# Patient Record
Sex: MALE | Race: WHITE | HISPANIC OR LATINO | Employment: UNEMPLOYED | URBAN - METROPOLITAN AREA
[De-identification: names, ages, dates, MRNs, and addresses within clinical notes are randomized per-mention and may not be internally consistent; named-entity substitution may affect disease eponyms.]

---

## 2024-02-02 ENCOUNTER — OFFICE VISIT (OUTPATIENT)
Dept: INTERNAL MEDICINE CLINIC | Facility: CLINIC | Age: 54
End: 2024-02-02

## 2024-02-02 ENCOUNTER — APPOINTMENT (OUTPATIENT)
Dept: LAB | Facility: CLINIC | Age: 54
End: 2024-02-02

## 2024-02-02 VITALS
WEIGHT: 195.5 LBS | BODY MASS INDEX: 29.73 KG/M2 | HEART RATE: 64 BPM | DIASTOLIC BLOOD PRESSURE: 78 MMHG | OXYGEN SATURATION: 98 % | TEMPERATURE: 98.6 F | SYSTOLIC BLOOD PRESSURE: 123 MMHG

## 2024-02-02 DIAGNOSIS — R30.0 DYSURIA: Primary | ICD-10-CM

## 2024-02-02 DIAGNOSIS — R71.8 ELEVATED HEMATOCRIT: Primary | ICD-10-CM

## 2024-02-02 DIAGNOSIS — N40.0 BENIGN PROSTATIC HYPERPLASIA, UNSPECIFIED WHETHER LOWER URINARY TRACT SYMPTOMS PRESENT: ICD-10-CM

## 2024-02-02 DIAGNOSIS — Z11.59 NEED FOR HEPATITIS C SCREENING TEST: ICD-10-CM

## 2024-02-02 DIAGNOSIS — G89.29 CHRONIC LEFT-SIDED LOW BACK PAIN WITH LEFT-SIDED SCIATICA: ICD-10-CM

## 2024-02-02 DIAGNOSIS — M54.42 CHRONIC LEFT-SIDED LOW BACK PAIN WITH LEFT-SIDED SCIATICA: ICD-10-CM

## 2024-02-02 DIAGNOSIS — R30.0 DYSURIA: ICD-10-CM

## 2024-02-02 PROBLEM — Z87.442 HISTORY OF NEPHROLITHIASIS: Status: ACTIVE | Noted: 2024-02-02

## 2024-02-02 PROBLEM — N39.0 RECURRENT UTI (URINARY TRACT INFECTION): Status: ACTIVE | Noted: 2024-02-02

## 2024-02-02 LAB
ANION GAP SERPL CALCULATED.3IONS-SCNC: 6 MMOL/L
BILIRUB UR QL STRIP: NEGATIVE
BUN SERPL-MCNC: 24 MG/DL (ref 5–25)
CALCIUM SERPL-MCNC: 9.8 MG/DL (ref 8.4–10.2)
CHLORIDE SERPL-SCNC: 104 MMOL/L (ref 96–108)
CLARITY UR: CLEAR
CO2 SERPL-SCNC: 29 MMOL/L (ref 21–32)
COLOR UR: NORMAL
CREAT SERPL-MCNC: 1.06 MG/DL (ref 0.6–1.3)
ERYTHROCYTE [DISTWIDTH] IN BLOOD BY AUTOMATED COUNT: 12.6 % (ref 11.6–15.1)
GFR SERPL CREATININE-BSD FRML MDRD: 79 ML/MIN/1.73SQ M
GLUCOSE SERPL-MCNC: 91 MG/DL (ref 65–140)
GLUCOSE UR STRIP-MCNC: NEGATIVE MG/DL
HCT VFR BLD AUTO: 50.7 % (ref 36.5–49.3)
HGB BLD-MCNC: 17.1 G/DL (ref 12–17)
HGB UR QL STRIP.AUTO: NEGATIVE
KETONES UR STRIP-MCNC: NEGATIVE MG/DL
LEUKOCYTE ESTERASE UR QL STRIP: NEGATIVE
MCH RBC QN AUTO: 30.6 PG (ref 26.8–34.3)
MCHC RBC AUTO-ENTMCNC: 33.7 G/DL (ref 31.4–37.4)
MCV RBC AUTO: 91 FL (ref 82–98)
NITRITE UR QL STRIP: NEGATIVE
PH UR STRIP.AUTO: 6 [PH]
PLATELET # BLD AUTO: 243 THOUSANDS/UL (ref 149–390)
PMV BLD AUTO: 11.4 FL (ref 8.9–12.7)
POTASSIUM SERPL-SCNC: 4.5 MMOL/L (ref 3.5–5.3)
PROT UR STRIP-MCNC: NEGATIVE MG/DL
RBC # BLD AUTO: 5.58 MILLION/UL (ref 3.88–5.62)
SL AMB  POCT GLUCOSE, UA: NEGATIVE
SL AMB LEUKOCYTE ESTERASE,UA: NEGATIVE
SL AMB POCT BILIRUBIN,UA: NEGATIVE
SL AMB POCT BLOOD,UA: NORMAL
SL AMB POCT CLARITY,UA: CLEAR
SL AMB POCT COLOR,UA: YELLOW
SL AMB POCT KETONES,UA: NEGATIVE
SL AMB POCT NITRITE,UA: NEGATIVE
SL AMB POCT PH,UA: 6
SL AMB POCT SPECIFIC GRAVITY,UA: 1.02
SL AMB POCT URINE PROTEIN: 1
SL AMB POCT UROBILINOGEN: 0.2
SODIUM SERPL-SCNC: 139 MMOL/L (ref 135–147)
SP GR UR STRIP.AUTO: 1.02 (ref 1–1.03)
UROBILINOGEN UR STRIP-ACNC: <2 MG/DL
WBC # BLD AUTO: 5.36 THOUSAND/UL (ref 4.31–10.16)

## 2024-02-02 PROCEDURE — 99213 OFFICE O/P EST LOW 20 MIN: CPT | Performed by: STUDENT IN AN ORGANIZED HEALTH CARE EDUCATION/TRAINING PROGRAM

## 2024-02-02 PROCEDURE — 81003 URINALYSIS AUTO W/O SCOPE: CPT

## 2024-02-02 PROCEDURE — 85027 COMPLETE CBC AUTOMATED: CPT

## 2024-02-02 PROCEDURE — 87389 HIV-1 AG W/HIV-1&-2 AB AG IA: CPT

## 2024-02-02 PROCEDURE — 80048 BASIC METABOLIC PNL TOTAL CA: CPT

## 2024-02-02 PROCEDURE — 87521 HEPATITIS C PROBE&RVRS TRNSC: CPT

## 2024-02-02 PROCEDURE — 36415 COLL VENOUS BLD VENIPUNCTURE: CPT

## 2024-02-02 PROCEDURE — 81002 URINALYSIS NONAUTO W/O SCOPE: CPT | Performed by: STUDENT IN AN ORGANIZED HEALTH CARE EDUCATION/TRAINING PROGRAM

## 2024-02-02 PROCEDURE — 87491 CHLMYD TRACH DNA AMP PROBE: CPT

## 2024-02-02 PROCEDURE — 86780 TREPONEMA PALLIDUM: CPT

## 2024-02-02 PROCEDURE — 87591 N.GONORRHOEAE DNA AMP PROB: CPT

## 2024-02-02 PROCEDURE — 87522 HEPATITIS C REVRS TRNSCRPJ: CPT

## 2024-02-02 RX ORDER — TADALAFIL 5 MG/1
5 TABLET ORAL DAILY
Qty: 30 TABLET | Refills: 0 | Status: SHIPPED | OUTPATIENT
Start: 2024-02-02 | End: 2024-03-03

## 2024-02-02 RX ORDER — GABAPENTIN 100 MG/1
100 CAPSULE ORAL
Qty: 30 CAPSULE | Refills: 3 | Status: SHIPPED | OUTPATIENT
Start: 2024-02-02 | End: 2024-06-01

## 2024-02-02 NOTE — PROGRESS NOTES
Name: Hiral Cullen      : 1970      MRN: [id number]  Encounter Provider: Kris Shell MD  Encounter Date: 2024   Encounter department: [facility]    Assessment & Plan       1. Dysuria  - Pt is a 52 Yo M with PMH of sciatica, Left nephrolithiasis with hydronephrosis s/p stent, recurrent UTIs, presenting with 1 week history of dysuria. POCT urine test negative except for trace blood. No nitrate or leukocytes. Findings include suprapubic tenderness and L CTA tenderness. Differential includes nephrolithiasis vs UTI with possible pyelonephritis, though this is less likely given the absence of constitutional symptoms. Plan for CBC, UA with culture, STI screening, renal/bladder ultrasound with PVR, and cialis for possible BPH.   - If symptoms do not resolve, or worsens, instructed patient to go to the ED. May need urologic referral for possible cystoscopy given recurrent UTIs.   -     CBC and Platelet; Future  -     Basic metabolic panel; Future  -     POCT urine dip- negative  -     US kidney and bladder with pvr; Future; Expected date: 2024  -     HIV 1/2 AG/AB w Reflex [facility] for 2 yr old and above; Future  -     RPR-Syphilis Screening (Total Syphilis IGG/IGM); Future  -     Hepatitis C Ab W/Refl To HCV RNA, Qn, PCR; Future  -     Chlamydia/GC amplified DNA by PCR; Future  -     tadalafil (CIALIS) 5 MG tablet; Take 1 tablet (5 mg total) by mouth in the morning  -     UA w Reflex to Microscopic w Reflex to Culture    2. Need for hepatitis C screening test    3. Chronic left-sided low back pain with left-sided sciatica  - Patient reporting chronic sciatica, progressively worsening. Has tried exercises but without relief. He was prescribed gabapentin, but has not taken it because of issues with pharmacy. I re-prescribed it today and will recheck during next visit.   -     gabapentin (Neurontin) 100 mg capsule; Take 1 capsule (100 mg total) by mouth daily at  bedtime    4. Benign prostatic hyperplasia, unspecified whether lower urinary tract symptoms present  - Rectal exam to assess prostate unremarkable. Regular borders and prostate not enlarged. Will trial tadalafil at lose dose 5mg. No cardiac history to be concerned with.   -     tadalafil (CIALIS) 5 MG tablet; Take 1 tablet (5 mg total) by mouth in the morning        Depression Screening and Follow-up Plan: Patient was screened for depression during today's encounter. They screened negative with a PHQ-2 score of 2.        Subjective      Patient here originally for a comprehensive physical exam; however, this was changed for a focus problem exam given patient's complaints. The patient reports  1 week history of burning with urination without hematuria . 2 years ago, patient had large kidney stone with hydronephrosis in the left side requiring stent and suprapubic catheter. Does not remember if stent was removed but remembers having few complications. This was done in [facility]. Afterwards, patient had UTI at least 2-3 times, treated with antibiotics. Pt states his current symptoms are worse. Pt was also told he had an enlarged prostate. He has associated difficulty urinating and emptying his bladder. He has not been treated for this. Pain localized to suprapubic area as well as L flank pain. No flank pain from the previous UTI. He was told that he had other kidney stones in the right side but was not large enough for intervention. Urine POCT here showing trace blood.      Denies fever, chills, nausea, vomiting, no changes in appetite,   No CP or SOB.   No joint swelling, but reporting pain of bilateral knees and elbows 3 months  No body rash  Sexually active yes. Monomagous  No prior h/o of STI  No abn penile discharge.         Review of Systems   Constitutional:  Negative for activity change, appetite change, chills, fatigue, fever and unexpected weight change.   HENT:  Positive for sore throat. Negative for sinus  pain.    Eyes:  Negative for visual disturbance.   Respiratory:  Negative for cough, shortness of breath and wheezing.    Genitourinary:  Positive for dysuria and frequency. Negative for genital sores, hematuria, penile discharge, penile pain and penile swelling.   Musculoskeletal:  Positive for back pain. Negative for neck pain.   Skin:  Negative for rash.   Neurological:  Negative for dizziness, light-headedness, numbness and headaches.       Current Outpatient Medications on File Prior to Visit   Medication Sig    aspirin (ECOTRIN LOW STRENGTH) 81 mg EC tablet Take 81 mg by mouth daily    Diclofenac Sodium (VOLTAREN) 1 % Apply 2 g topically 4 (four) times a day For lower back pain.    [DISCONTINUED] gabapentin (Neurontin) 100 mg capsule Take 1 capsule (100 mg total) by mouth daily at bedtime       Objective     /78 (BP Location: Right arm, Patient Position: Sitting, Cuff Size: Standard)   Pulse 64   Temp 98.6 °F (37 °C) (Temporal)   Wt 88.7 kg (195 lb 8 oz)   SpO2 98%   BMI 29.73 kg/m²     Physical Exam  Constitutional:       General: He is not in acute distress.     Appearance: Normal appearance. He is normal weight. He is not ill-appearing or toxic-appearing.   HENT:      Head: Normocephalic and atraumatic.      Nose: Nose normal.      Mouth/Throat:      Mouth: Mucous membranes are moist.      Pharynx: Posterior oropharyngeal erythema present.   Eyes:      Pupils: Pupils are equal, round, and reactive to light.      Comments: Bilateral red eye, states it's chronic   Cardiovascular:      Rate and Rhythm: Normal rate and regular rhythm.      Pulses: Normal pulses.      Heart sounds: Normal heart sounds. No murmur heard.  Pulmonary:      Effort: Pulmonary effort is normal. No respiratory distress.      Breath sounds: Normal breath sounds.   Abdominal:      General: Abdomen is flat. Bowel sounds are normal.      Tenderness: There is abdominal tenderness (suprapubic and area overlaying L ureter). There is  left CVA tenderness. There is no right CVA tenderness.   Genitourinary:     Prostate: Normal.      Comments: GIOVANNY for prostate, prostate border regularly and not enlarged  Musculoskeletal:         General: Tenderness (on bilateral knee but no swelling) present. No swelling or deformity. Normal range of motion.   Lymphadenopathy:      Cervical: Cervical adenopathy (bilateral) present.   Skin:     General: Skin is warm.      Capillary Refill: Capillary refill takes less than 2 seconds.      Findings: No lesion or rash.   Neurological:      General: No focal deficit present.      Mental Status: He is alert and oriented to person, place, and time.   Psychiatric:         Mood and Affect: Mood normal.         Behavior: Behavior normal.       Kris Shell MD

## 2024-02-02 NOTE — PATIENT INSTRUCTIONS
Por favor, hágase valdez análisis de candice hoy   Por favor, vaya al centro de imágenes para hacerse yusuf ecografía de valdez riñón y también yusuf ecografía residual postmiccional [place] verifica cuánta orina queda después de orinar   Hoy no parecía que valdez próstata estuviera agrandada   Lo estoy comenzando con cialis 5mg para que lo tome yusuf vez al día [place] debería ayudar si tiene la próstata agrandada Si no está viendo ayuda con esto, podemos intentar algo más la próxima vez   Esperaremos a chaim si valdez muestra de orina crece alguna bacteria.Si es así, comenzaremos con antibióticos No estamos seguros si kaylee síntomas son de wisam en el riñón o de yusuf nueva infección Si kaylee síntomas empeoran, lauren fiebre, náuseas, vómitos o el dolor es peor, por favor vaya al departamento de emergencias Por favor regrese en 6 semanas para seguimiento y examen físico anual completo    Please get your blood work done today.   Please go to imaging center to get ultrasound of your kidney and do a post-void residual ultrasound as well. This checks how much urine is left after urinating.   It did not seem your prostate was enlarged today.   I am starting you on cialis 5mg for you take once a day. This should help if you have enlarged prostate. If you are not seeing help with this, we can try something else next time.   We will wait to see if your urine sample grows any bacteria. If it does, we will start you on antibiotics. We're not sure if your symptoms are from kidney stone or new infection.   If your symptoms get any worse, like fever, nausea, vomiting, or the pain is worse, please go to the emergency department.  Please return in 6 week  for follow up and full annual physical.

## 2024-02-03 LAB
HIV 1+2 AB+HIV1 P24 AG SERPL QL IA: NORMAL
HIV 2 AB SERPL QL IA: NORMAL
HIV1 AB SERPL QL IA: NORMAL
HIV1 P24 AG SERPL QL IA: NORMAL
TREPONEMA PALLIDUM IGG+IGM AB [PRESENCE] IN SERUM OR PLASMA BY IMMUNOASSAY: NORMAL

## 2024-02-04 LAB
C TRACH DNA SPEC QL NAA+PROBE: NEGATIVE
HCV RNA SERPL NAA+PROBE-ACNC: NOT DETECTED K[IU]/ML
N GONORRHOEA DNA SPEC QL NAA+PROBE: NEGATIVE

## 2024-02-06 ENCOUNTER — TELEPHONE (OUTPATIENT)
Dept: INTERNAL MEDICINE CLINIC | Facility: CLINIC | Age: 54
End: 2024-02-06

## 2024-02-06 NOTE — RESULT ENCOUNTER NOTE
All the results are within normal limits including HIV, syphilis, chlamydia, gonorrhea, Hepatitis C, but Hgb/blood count, appears to be elevated. I would like to get this rechecked. I tried calling today with a . Called both the patient and patient's spouse but was unable to reach them.     Can someone please call Mr. Leonard Cullen back and let patient know to get his blood work done again in 1 week? Thank you.

## 2024-02-21 PROBLEM — N39.0 RECURRENT UTI (URINARY TRACT INFECTION): Status: RESOLVED | Noted: 2024-02-02 | Resolved: 2024-02-21

## 2024-03-13 ENCOUNTER — HOSPITAL ENCOUNTER (OUTPATIENT)
Dept: RADIOLOGY | Facility: HOSPITAL | Age: 54
Discharge: HOME | End: 2024-03-13

## 2024-03-13 ENCOUNTER — APPOINTMENT (OUTPATIENT)
Dept: RADIOLOGY | Facility: HOSPITAL | Age: 54
End: 2024-03-13

## 2024-03-13 DIAGNOSIS — R30.0 DYSURIA: ICD-10-CM

## 2024-03-13 PROCEDURE — 76770 US EXAM ABDO BACK WALL COMP: CPT

## 2024-03-28 ENCOUNTER — APPOINTMENT (OUTPATIENT)
Dept: LAB | Facility: CLINIC | Age: 54
End: 2024-03-28

## 2024-03-28 ENCOUNTER — OFFICE VISIT (OUTPATIENT)
Dept: INTERNAL MEDICINE CLINIC | Facility: CLINIC | Age: 54
End: 2024-03-28

## 2024-03-28 VITALS
HEART RATE: 73 BPM | WEIGHT: 197 LBS | SYSTOLIC BLOOD PRESSURE: 122 MMHG | BODY MASS INDEX: 29.95 KG/M2 | DIASTOLIC BLOOD PRESSURE: 74 MMHG

## 2024-03-28 DIAGNOSIS — D75.1 ERYTHROCYTOSIS: ICD-10-CM

## 2024-03-28 DIAGNOSIS — Z00.00 ANNUAL PHYSICAL EXAM: Primary | ICD-10-CM

## 2024-03-28 DIAGNOSIS — R30.0 DYSURIA: ICD-10-CM

## 2024-03-28 DIAGNOSIS — R71.8 ELEVATED HEMATOCRIT: ICD-10-CM

## 2024-03-28 DIAGNOSIS — G89.29 CHRONIC LEFT-SIDED LOW BACK PAIN WITH LEFT-SIDED SCIATICA: ICD-10-CM

## 2024-03-28 DIAGNOSIS — N18.2 STAGE 2 CHRONIC KIDNEY DISEASE: ICD-10-CM

## 2024-03-28 DIAGNOSIS — M54.42 CHRONIC LEFT-SIDED LOW BACK PAIN WITH LEFT-SIDED SCIATICA: ICD-10-CM

## 2024-03-28 DIAGNOSIS — R61 DIAPHORESIS: ICD-10-CM

## 2024-03-28 DIAGNOSIS — N40.0 BENIGN PROSTATIC HYPERPLASIA, UNSPECIFIED WHETHER LOWER URINARY TRACT SYMPTOMS PRESENT: ICD-10-CM

## 2024-03-28 LAB
ALBUMIN SERPL BCP-MCNC: 4.7 G/DL (ref 3.5–5)
ALP SERPL-CCNC: 56 U/L (ref 34–104)
ALT SERPL W P-5'-P-CCNC: 27 U/L (ref 7–52)
AST SERPL W P-5'-P-CCNC: 20 U/L (ref 13–39)
BILIRUB DIRECT SERPL-MCNC: 0.09 MG/DL (ref 0–0.2)
BILIRUB SERPL-MCNC: 0.74 MG/DL (ref 0.2–1)
CREAT UR-MCNC: 117.9 MG/DL
ERYTHROCYTE [DISTWIDTH] IN BLOOD BY AUTOMATED COUNT: 11.9 % (ref 11.6–15.1)
HCT VFR BLD AUTO: 53.1 % (ref 36.5–49.3)
HGB BLD-MCNC: 18.2 G/DL (ref 12–17)
MAGNESIUM SERPL-MCNC: 2.2 MG/DL (ref 1.9–2.7)
MCH RBC QN AUTO: 31.5 PG (ref 26.8–34.3)
MCHC RBC AUTO-ENTMCNC: 34.3 G/DL (ref 31.4–37.4)
MCV RBC AUTO: 92 FL (ref 82–98)
MICROALBUMIN UR-MCNC: 25 MG/L
MICROALBUMIN/CREAT 24H UR: 21 MG/G CREATININE (ref 0–30)
PLATELET # BLD AUTO: 241 THOUSANDS/UL (ref 149–390)
PMV BLD AUTO: 11.9 FL (ref 8.9–12.7)
PROT SERPL-MCNC: 7.2 G/DL (ref 6.4–8.4)
RBC # BLD AUTO: 5.78 MILLION/UL (ref 3.88–5.62)
TSH SERPL DL<=0.05 MIU/L-ACNC: 1.88 UIU/ML (ref 0.45–4.5)
WBC # BLD AUTO: 5.55 THOUSAND/UL (ref 4.31–10.16)

## 2024-03-28 PROCEDURE — 82043 UR ALBUMIN QUANTITATIVE: CPT

## 2024-03-28 PROCEDURE — 99396 PREV VISIT EST AGE 40-64: CPT | Performed by: INTERNAL MEDICINE

## 2024-03-28 PROCEDURE — 36415 COLL VENOUS BLD VENIPUNCTURE: CPT

## 2024-03-28 PROCEDURE — 82570 ASSAY OF URINE CREATININE: CPT

## 2024-03-28 PROCEDURE — 84443 ASSAY THYROID STIM HORMONE: CPT

## 2024-03-28 PROCEDURE — 85027 COMPLETE CBC AUTOMATED: CPT

## 2024-03-28 PROCEDURE — 80076 HEPATIC FUNCTION PANEL: CPT

## 2024-03-28 PROCEDURE — 83735 ASSAY OF MAGNESIUM: CPT

## 2024-03-28 RX ORDER — GABAPENTIN 100 MG/1
300 CAPSULE ORAL
Qty: 90 CAPSULE | Refills: 3 | Status: SHIPPED | OUTPATIENT
Start: 2024-03-28 | End: 2024-07-26

## 2024-03-28 RX ORDER — MELATONIN
1000 DAILY
Qty: 30 TABLET | Refills: 3 | Status: SHIPPED | OUTPATIENT
Start: 2024-03-28 | End: 2024-07-26

## 2024-03-28 RX ORDER — TADALAFIL 5 MG/1
5 TABLET ORAL DAILY
Qty: 30 TABLET | Refills: 0 | Status: CANCELLED | OUTPATIENT
Start: 2024-03-28 | End: 2024-04-27

## 2024-03-28 RX ORDER — GABAPENTIN 100 MG/1
300 CAPSULE ORAL
Qty: 90 CAPSULE | Refills: 3 | Status: CANCELLED | OUTPATIENT
Start: 2024-03-28 | End: 2024-07-26

## 2024-03-28 NOTE — PATIENT INSTRUCTIONS
[place] si puede donar candice. Esta es yusuf de las formas en que puede tratar la candice espesa. Repitamos valdez análisis de candice para verificar y chaim si tiene yusuf mutación que le cause la candice espesa. Si esto resulta ser cierto, entonces me gustaría explorar formas de tratar esto en jonas momento.   Quiero hacerte yusuf radiografía de la espalda. A chaim si puedes hacer esto. Me gustaría tratarlo con suplementos de vitamina D en pankaj de que tenga un nivel bajo de calcio en la candice.   Aumenté valdez gabapentina de 100 mg a 300 mg para que lo tomes por la noche. Si valdez dolor de espalda y piernas no mejora, podemos explorar otras opciones.     See if you can give blood donation. This is one of the ways you can treat the thick blood. Lets repeat your blood work to double check and see if you have a mutation causing you to have the thick blood. If this comes back as true, then I'd like to explore ways to treat this at that point.   I want to get an xray of your back. See if you can get this done. I would like to treat you with vitamin D supplementation in case you have low blood calcium.   I increased your gabapentin from 100mg to 300mg for you to take at night. If your back and leg pain does not improve, then we can explore other options.     Wellness Visit for Adults   AMBULATORY CARE:   A wellness visit  is when you see your healthcare provider to get screened for health problems. Your healthcare provider will also give you advice on how to stay healthy. Write down your questions so you remember to ask them. Ask your healthcare provider how often you should have a wellness visit.  What happens at a wellness visit:  Your healthcare provider will ask about your health, and your family history of health problems. This includes high blood pressure, heart disease, and cancer. He or she will ask if you have symptoms that concern you, if you smoke, and about your mood. You may also be asked about your intake of medicines,  supplements, food, and alcohol. Any of the following may be done:  Your weight  will be checked. Your height may also be checked so your body mass index (BMI) can be calculated. Your BMI shows if you are at a healthy weight.    Your blood pressure  and heart rate will be checked. Your temperature may also be checked.    Blood and urine tests  may be done. Blood tests may be done to check your cholesterol levels. Abnormal cholesterol levels increase your risk for heart disease and stroke. You may also need a blood or urine test to check for diabetes if you are at increased risk. Urine tests may be done to look for signs of an infection or kidney disease.    A physical exam  includes checking your heartbeat and lungs with a stethoscope. Your healthcare provider may also check your skin to look for sun damage.    Screening tests  may be recommended. A screening test is done to check for diseases that may not cause symptoms. The screening tests you may need depend on your age, gender, family history, and lifestyle habits. For example, colorectal screening may be recommended if you are 50 years old or older.    Screening tests you need if you are a woman:   A Pap smear  is used to screen for cervical cancer. Pap smears are usually done every 3 to 5 years depending on your age. You may need them more often if you have had abnormal Pap smear test results in the past. Ask your healthcare provider how often you should have a Pap smear.    A mammogram  is an x-ray of your breasts to screen for breast cancer. Experts recommend mammograms every 2 years starting at age 50 years. You may need a mammogram at age 49 years or younger if you have an increased risk for breast cancer. Talk to your healthcare provider about when you should start having mammograms and how often you need them.    Vaccines you may need:   Get an influenza vaccine  every year. The influenza vaccine protects you from the flu. Several types of viruses cause  the flu. The viruses change over time, so new vaccines are made each year.    Get a tetanus-diphtheria (Td) booster vaccine  every 10 years. This vaccine protects you against tetanus and diphtheria. Tetanus is a severe infection that may cause painful muscle spasms and lockjaw. Diphtheria is a severe bacterial infection that causes a thick covering in the back of your mouth and throat.    Get a human papillomavirus (HPV) vaccine  if you are female and aged 19 to 26 or male 19 to 21 and never received it. This vaccine protects you from HPV infection. HPV is the most common infection spread by sexual contact. HPV may also cause vaginal, penile, and anal cancers.    Get a pneumococcal vaccine  if you are aged 65 years or older. The pneumococcal vaccine is an injection given to protect you from pneumococcal disease. Pneumococcal disease is an infection caused by pneumococcal bacteria. The infection may cause pneumonia, meningitis, or an ear infection.    Get a shingles vaccine  if you are 60 or older, even if you have had shingles before. The shingles vaccine is an injection to protect you from the varicella-zoster virus. This is the same virus that causes chickenpox. Shingles is a painful rash that develops in people who had chickenpox or have been exposed to the virus.    How to eat healthy:  My Plate is a model for planning healthy meals. It shows the types and amounts of foods that should go on your plate. Fruits and vegetables make up about half of your plate, and grains and protein make up the other half. A serving of dairy is included on the side of your plate. The amount of calories and serving sizes you need depends on your age, gender, weight, and height. Examples of healthy foods are listed below:  Eat a variety of vegetables  such as dark green, red, and orange vegetables. You can also include canned vegetables low in sodium (salt) and frozen vegetables without added butter or sauces.    Eat a variety of  fresh fruits , canned fruit in 100% juice, frozen fruit, and dried fruit.    Include whole grains.  At least half of the grains you eat should be whole grains. Examples include whole-wheat bread, wheat pasta, brown rice, and whole-grain cereals such as oatmeal.    Eat a variety of protein foods such as seafood (fish and shellfish), lean meat, and poultry without skin (turkey and chicken). Examples of lean meats include pork leg, shoulder, or tenderloin, and beef round, sirloin, tenderloin, and extra lean ground beef. Other protein foods include eggs and egg substitutes, beans, peas, soy products, nuts, and seeds.    Choose low-fat dairy products such as skim or 1% milk or low-fat yogurt, cheese, and cottage cheese.    Limit unhealthy fats  such as butter, hard margarine, and shortening.       Exercise:  Exercise at least 30 minutes per day on most days of the week. Some examples of exercise include walking, biking, dancing, and swimming. You can also fit in more physical activity by taking the stairs instead of the elevator or parking farther away from stores. Include muscle strengthening activities 2 days each week. Regular exercise provides many health benefits. It helps you manage your weight, and decreases your risk for type 2 diabetes, heart disease, stroke, and high blood pressure. Exercise can also help improve your mood. Ask your healthcare provider about the best exercise plan for you.       General health and safety guidelines:   Do not smoke.  Nicotine and other chemicals in cigarettes and cigars can cause lung damage. Ask your healthcare provider for information if you currently smoke and need help to quit. E-cigarettes or smokeless tobacco still contain nicotine. Talk to your healthcare provider before you use these products.    Limit alcohol.  A drink of alcohol is 12 ounces of beer, 5 ounces of wine, or 1½ ounces of liquor.    Lose weight, if needed.  Being overweight increases your risk of certain  health conditions. These include heart disease, high blood pressure, type 2 diabetes, and certain types of cancer.    Protect your skin.  Do not sunbathe or use tanning beds. Use sunscreen with a SPF 15 or higher. Apply sunscreen at least 15 minutes before you go outside. Reapply sunscreen every 2 hours. Wear protective clothing, hats, and sunglasses when you are outside.    Drive safely.  Always wear your seatbelt. Make sure everyone in your car wears a seatbelt. A seatbelt can save your life if you are in an accident. Do not use your cell phone when you are driving. This could distract you and cause an accident. Pull over if you need to make a call or send a text message.    Practice safe sex.  Use latex condoms if are sexually active and have more than one partner. Your healthcare provider may recommend screening tests for sexually transmitted infections (STIs).    Wear helmets, lifejackets, and protective gear.  Always wear a helmet when you ride a bike or motorcycle, go skiing, or play sports that could cause a head injury. Wear protective equipment when you play sports. Wear a lifejacket when you are on a boat or doing water sports.    © Copyright [employer] 2023 Information is for End User's use only and may not be sold, redistributed or otherwise used for commercial purposes.  The above information is an  only. It is not intended as medical advice for individual conditions or treatments. Talk to your doctor, nurse or pharmacist before following any medical regimen to see if it is safe and effective for you.

## 2024-03-28 NOTE — PROGRESS NOTES
ADULT ANNUAL PHYSICAL  [facility] [place]    NAME: Hiral Cullen  AGE: 53 y.o. SEX: male  : 1970     DATE: 3/28/2024     Assessment and Plan:     Problem List Items Addressed This Visit    None  Visit Diagnoses       Annual physical exam    -  Primary  Here for annual physical. Recently established care here. Moved from [facility] 5 months ago. Due to financial constraints, he has not had regular dental check or eye checks. Sleep quality not great. 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 Appears to have low health literacy. Will need regular follow up to ensure good understanding of patient's medical conditions. Unable to fully address annual check up tasks.   A1c 5.5. Appropriate  Prior nephrolithiasis with most likely new diagnosis of CKD2. Kidney damage not from HTN or DM. Will need to be monitored with BMP q6 months. Will also check microalbumin UA. 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 Less likely malignancy but will check if other differentials are ruled out. Will first recheck hemoglobin and hct. Will attempt to check HFE mutation, but given lack of insurance, may not be covered  During next visit, will recommend he sees . He will need ongoing financial support and additional health literacy education    BMI 29.0-29.9,adult      Encourage better diet and exercise      Dysuria        Previously here with urinary symptoms like CVA tenderness, dysuria, and suprapubic pain. At that time, POCT urine test clean. BMP kidney function wnl. STI neg. UA with microscope neg. CBC notable for Hgb 17 and Hct 50.7%.   US bladder kidney with PVR showing enlarged prostate and  bilateral renal parenchyma is diffusely echogenic consistent with medical renal disease. BMP indicating CKD2 likely from prior kidney injury. Will need ongoing monitoring  Ordered Cialis: Symptoms consistent with obstructive. are almost back to normal but with some dysuria.   Therefore, dysuria most likely from BPH. Will c/w cialis.      Benign prostatic hyperplasia, unspecified whether lower urinary tract symptoms present        Chronic left-sided low back pain with left-sided sciatica      sciatica to left leg. Progressively worsening due to work. Without significant red flag symptoms. Does not remember if he has had any injuries growing up. Will monitor patient's sensation. Will order XR. If concerning, will follow up with MRI. If XR mostly normal, then will proceed with PT. Also plan to increase gabapentin from low dose 100mg HS to 300mg HS. If he tolerates without significant fatigue in the morning, will consider dividing up the dose throughout the day. Will also start vitamin D in case patient actually has compression fracture.    Relevant Medications    cholecalciferol (VITAMIN D3) 1,000 units tablet    gabapentin (Neurontin) 100 mg capsule    Other Relevant Orders    XR spine lumbar minimum 4 views non injury    Ambulatory Referral to Financial Counseling Program    Diaphoresis      May be from high Hgb and Hct. Will check TSH. Unlikely malignancy.     Relevant Orders    TSH, 3rd generation with Free T4 reflex (Completed)    Erythrocytosis    Will check HFT, recheck Hgb and hct, as well as HFE mutation screen if he can get it covered. Most likely due to high altitude erythrocytosis which should start to resolve/improve [place]. If still elevated, may need to consider hemochromatosis, which will need therapeutic phlebotomies in the future.       Relevant Orders    Hemoglobin and hematocrit, blood    Hepatic function panel    Hemochromatosis mutation    Ambulatory Referral  "to Financial Counseling Program    Stage 2 chronic kidney disease        Relevant Orders    Albumin / creatinine urine ratio (Completed)    Magnesium                Immunizations and preventive care screenings were discussed with patient today. Appropriate education was printed on patient's after visit summary.        Counseling:  Alcohol/drug use: discussed moderation in alcohol intake, the recommendations for healthy alcohol use, and avoidance of illicit drug use.  Dental Health: discussed importance of regular tooth brushing, flossing, and dental visits.  Exercise: the importance of regular exercise/physical activity was discussed. Recommend exercise 3-5 times per week for at least 30 minutes.          Return in about 3 months (around 6/28/2024) for Recheck with Dr. Shell.     Chief Complaint:     Chief Complaint   Patient presents with    Physical Exam     Annual Physical       History of Present Illness:     Adult Annual Physical   Patient here for a comprehensive physical exam. Hiral Jackson is a 53 y.o. male with a past medical history of sciatica, nephrolithiasis s/p stent c/b infection with recurrent UTI, and chronic back pain. Pt previously in office for UTI like symptoms, but all work up negative, except for enlarged prostate and echogenic renal parenchyma on US bladder/kidney. At the visit, I prescribed Cialis which appears to have improved his symptoms. CBC did reveal, hwoever, Hgb 17, Hct 50.7%. Pt states that in [facility], he was told he had \"thick blood\" and for that reason, he was told to take ASA81. He has been on this medication for the past 3-4 years. Of importance, pt endorses living on high altitude in [facility] (3800ft above see level to be exact). He claims that many others in the area also have \"thick blood.\" Denies alcohol use or tobacco use. No HTN or obesity h/o. No h/o blood clots. No known family h/o erythrocytosis. No hot water intolerance. Does not bruise or bleed easily. No b symptoms " "except for fatigue and nighttime diaphoresis, which started 2020 after COVID. No weight or appetite changes. No early satiety. Family hx sig for brother with MI and death ~age 35.     Besides this, pt complains of bilateral red eyes which has been present for the past 20 years. He is distressed from it because it is commonly mistaken as \"drug use.\" No known allergies. No drainage. No eye pain or visual disturbance. No URI symptoms. No injury or chemical contact. Red eyes resolve with constant lubricant drops, but then slowly returns. Previous UA negative for proteinuria. Denies dry mouth. No skin rash. No hematuria. No joint complaint. Drinks about 4 bottles of water a day without polyuria or polydipsia. No h/o diabetes.     Additionally, pt c/o back pain with sciatica to left leg. Progressively worsening due to work. Denying left leg weakness. Burning sensation to the foot. Had scans in [facility] which, per patient, revealed compression fracture? Decreased sensation of L. During last visit, patient was started on gabapentin 100mg HS. Pain did not improve. Will increase to 300 mg HS.  XR of spine?    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   Exercise: 1-2 times a week on average and facebook exercises .      Depression Screening  PHQ-2/9 Depression Screening           General Health  Sleep: sleeps well and gets 4-6 hours of sleep on average.   Hearing: normal - bilateral.  Vision: no vision problems and most recent eye exam >1 year ago.   Dental: no dental visits for >1 year and brushes teeth twice daily.        Health  Symptoms include: dysuria    Advanced Care Planning  Do you have an advanced directive? no  Do you have a durable medical power of ? no  ACP document given to patient? no     Review of Systems:     Review of Systems   Constitutional:  Positive for fatigue. Negative for activity change, appetite " change, chills, fever and unexpected weight change.   HENT:  Negative for ear pain, mouth sores, rhinorrhea, sore throat and trouble swallowing.    Eyes:  Negative for pain and visual disturbance.   Respiratory:  Negative for cough, shortness of breath and wheezing.    Cardiovascular:  Negative for chest pain, palpitations and leg swelling.   Gastrointestinal:  Negative for abdominal pain, nausea and vomiting.   Genitourinary:  Positive for dysuria and flank pain. Negative for frequency and hematuria.   Musculoskeletal:  Positive for back pain and gait problem. Negative for joint swelling, neck pain and neck stiffness.   Skin:  Negative for rash.   Neurological:  Positive for weakness. Negative for light-headedness.   Hematological:  Does not bruise/bleed easily.      Past Medical History:     History reviewed. No pertinent past medical history.   Past Surgical History:     Past Surgical History:   Procedure Laterality Date    BLADDER STONE REMOVAL        Family History:     History reviewed. No pertinent family history.   Social History:     Social History     Socioeconomic History    Marital status: /Civil Union     Spouse name: None    Number of children: None    Years of education: None    Highest education level: None   Occupational History    None   Tobacco Use    Smoking status: Never    Smokeless tobacco: Never   Vaping Use    Vaping status: Never Used   Substance and Sexual Activity    Alcohol use: Not Currently    Drug use: Never    Sexual activity: None   Other Topics Concern    None   Social History Narrative    None     Social Determinants of Health     Financial Resource Strain: Low Risk  (2/2/2024)    Overall Financial Resource Strain (CARDIA)     Difficulty of Paying Living Expenses: Not hard at all   Food Insecurity: No Food Insecurity (2/2/2024)    Hunger Vital Sign     Worried About Running Out of Food in the Last Year: Never true     Ran Out of Food in the Last Year: Never true    Transportation Needs: No Transportation Needs (2/2/2024)    PRAPARE - Transportation     Lack of Transportation (Medical): No     Lack of Transportation (Non-Medical): No   Physical Activity: Not on file   Stress: Not on file   Social Connections: Not on file   Intimate Partner Violence: Not on file   Housing Stability: Low Risk  (3/28/2024)    Housing Stability Vital Sign     Unable to Pay for Housing in the Last Year: No     Number of Places Lived in the Last Year: 1     Unstable Housing in the Last Year: No      Current Medications:     Current Outpatient Medications   Medication Sig Dispense Refill    cholecalciferol (VITAMIN D3) 1,000 units tablet Take 1 tablet (1,000 Units total) by mouth daily 30 tablet 3    gabapentin (Neurontin) 100 mg capsule Take 3 capsules (300 mg total) by mouth daily at bedtime 90 capsule 3    aspirin (ECOTRIN LOW STRENGTH) 81 mg EC tablet Take 81 mg by mouth daily      Diclofenac Sodium (VOLTAREN) 1 % Apply 2 g topically 4 (four) times a day For lower back pain. 100 g 3    tadalafil (CIALIS) 5 MG tablet Take 1 tablet (5 mg total) by mouth in the morning 30 tablet 0     No current facility-administered medications for this visit.      Allergies:     No Known Allergies   Physical Exam:     /74 (BP Location: Right arm, Patient Position: Sitting, Cuff Size: Large)   Pulse 73   Wt 89.4 kg (197 lb)   BMI 29.95 kg/m²     Physical Exam  Constitutional:       General: He is not in acute distress.     Appearance: Normal appearance. He is not ill-appearing, toxic-appearing or diaphoretic.   HENT:      Head: Normocephalic and atraumatic.      Right Ear: Tympanic membrane normal.      Left Ear: Tympanic membrane normal.      Nose: Nose normal. No congestion.      Mouth/Throat:      Mouth: Mucous membranes are moist.      Pharynx: No oropharyngeal exudate.      Comments: Chronically enlarged bilateral tonsils, without drainage  Eyes:      General:         Right eye: No discharge.          Left eye: No discharge.      Extraocular Movements: Extraocular movements intact.      Pupils: Pupils are equal, round, and reactive to light.      Comments: Bilateral red eyes   Neck:      Comments: Large neck size  Cardiovascular:      Rate and Rhythm: Normal rate and regular rhythm.      Pulses: Normal pulses.      Heart sounds: No murmur heard.     Gallop present.      Comments: Splitting of S1, best heard in apex, otherwise no murmur  Pulmonary:      Effort: Pulmonary effort is normal. No respiratory distress.      Breath sounds: Normal breath sounds. No wheezing.   Abdominal:      General: Abdomen is flat.      Tenderness: There is no abdominal tenderness.   Musculoskeletal:         General: No swelling.      Cervical back: Normal range of motion. No tenderness.      Comments: L paraspinal tenderness with assoc mild tenderness on spine and R paraspinal area   Lymphadenopathy:      Cervical: No cervical adenopathy.   Skin:     General: Skin is warm.      Capillary Refill: Capillary refill takes less than 2 seconds.   Neurological:      Mental Status: He is alert and oriented to person, place, and time.      Cranial Nerves: No cranial nerve deficit.      Sensory: Sensory deficit present.      Motor: Weakness present.      Gait: Gait abnormal.      Comments: Decreased sensation on L, muscle strength 5/5 on RLE, 4/5 on LLE, (+) straight leg raise on L, ROM in LE decrease L>R limited by pain, reflexes unremarkable bilaterally in LE   Psychiatric:         Mood and Affect: Mood normal.         Behavior: Behavior normal.         Kris Shell MD  [facility]

## 2024-04-12 ENCOUNTER — HOSPITAL ENCOUNTER (OUTPATIENT)
Dept: RADIOLOGY | Facility: HOSPITAL | Age: 54
Discharge: HOME | End: 2024-04-12

## 2024-04-12 DIAGNOSIS — G89.29 CHRONIC LEFT-SIDED LOW BACK PAIN WITH LEFT-SIDED SCIATICA: ICD-10-CM

## 2024-04-12 DIAGNOSIS — M54.42 CHRONIC LEFT-SIDED LOW BACK PAIN WITH LEFT-SIDED SCIATICA: ICD-10-CM

## 2024-04-12 PROCEDURE — 72110 X-RAY EXAM L-2 SPINE 4/>VWS: CPT

## 2024-04-28 ENCOUNTER — APPOINTMENT (EMERGENCY)
Dept: RADIOLOGY | Facility: HOSPITAL | Age: 54
End: 2024-04-28

## 2024-04-28 ENCOUNTER — HOSPITAL ENCOUNTER (EMERGENCY)
Facility: HOSPITAL | Age: 54
Discharge: HOME | End: 2024-04-28
Attending: EMERGENCY MEDICINE

## 2024-04-28 VITALS
TEMPERATURE: 97.6 F | RESPIRATION RATE: 17 BRPM | SYSTOLIC BLOOD PRESSURE: 113 MMHG | HEART RATE: 67 BPM | OXYGEN SATURATION: 95 % | DIASTOLIC BLOOD PRESSURE: 72 MMHG

## 2024-04-28 DIAGNOSIS — N20.0 KIDNEY STONE: Primary | ICD-10-CM

## 2024-04-28 LAB
ALBUMIN SERPL BCP-MCNC: 4.3 G/DL (ref 3.5–5)
ALP SERPL-CCNC: 46 U/L (ref 34–104)
ALT SERPL W P-5'-P-CCNC: 25 U/L (ref 7–52)
ANION GAP SERPL CALCULATED.3IONS-SCNC: 6 MMOL/L (ref 4–13)
AST SERPL W P-5'-P-CCNC: 19 U/L (ref 13–39)
BACTERIA UR QL AUTO: NORMAL /HPF
BASOPHILS # BLD AUTO: 0.03 THOUSANDS/ÂΜL (ref 0–0.1)
BASOPHILS NFR BLD AUTO: 1 % (ref 0–1)
BILIRUB SERPL-MCNC: 0.83 MG/DL (ref 0.2–1)
BILIRUB UR QL STRIP: NEGATIVE
BUN SERPL-MCNC: 23 MG/DL (ref 5–25)
CALCIUM SERPL-MCNC: 9.1 MG/DL (ref 8.4–10.2)
CHLORIDE SERPL-SCNC: 107 MMOL/L (ref 96–108)
CLARITY UR: CLEAR
CO2 SERPL-SCNC: 26 MMOL/L (ref 21–32)
COLOR UR: COLORLESS
CREAT SERPL-MCNC: 0.94 MG/DL (ref 0.6–1.3)
EOSINOPHIL # BLD AUTO: 0.2 THOUSAND/ÂΜL (ref 0–0.61)
EOSINOPHIL NFR BLD AUTO: 4 % (ref 0–6)
ERYTHROCYTE [DISTWIDTH] IN BLOOD BY AUTOMATED COUNT: 11.8 % (ref 11.6–15.1)
GFR SERPL CREATININE-BSD FRML MDRD: 91 ML/MIN/1.73SQ M
GLUCOSE SERPL-MCNC: 92 MG/DL (ref 65–140)
GLUCOSE UR STRIP-MCNC: NEGATIVE MG/DL
HCT VFR BLD AUTO: 47.7 % (ref 36.5–49.3)
HGB BLD-MCNC: 16.5 G/DL (ref 12–17)
HGB UR QL STRIP.AUTO: ABNORMAL
IMM GRANULOCYTES # BLD AUTO: 0.01 THOUSAND/UL (ref 0–0.2)
IMM GRANULOCYTES NFR BLD AUTO: 0 % (ref 0–2)
KETONES UR STRIP-MCNC: NEGATIVE MG/DL
LEUKOCYTE ESTERASE UR QL STRIP: NEGATIVE
LIPASE SERPL-CCNC: 30 U/L (ref 11–82)
LYMPHOCYTES # BLD AUTO: 1.41 THOUSANDS/ÂΜL (ref 0.6–4.47)
LYMPHOCYTES NFR BLD AUTO: 30 % (ref 14–44)
MCH RBC QN AUTO: 31.2 PG (ref 26.8–34.3)
MCHC RBC AUTO-ENTMCNC: 34.6 G/DL (ref 31.4–37.4)
MCV RBC AUTO: 90 FL (ref 82–98)
MONOCYTES # BLD AUTO: 0.3 THOUSAND/ÂΜL (ref 0.17–1.22)
MONOCYTES NFR BLD AUTO: 6 % (ref 4–12)
NEUTROPHILS # BLD AUTO: 2.73 THOUSANDS/ÂΜL (ref 1.85–7.62)
NEUTS SEG NFR BLD AUTO: 59 % (ref 43–75)
NITRITE UR QL STRIP: NEGATIVE
NON-SQ EPI CELLS URNS QL MICRO: NORMAL /HPF
NRBC BLD AUTO-RTO: 0 /100 WBCS
PH UR STRIP.AUTO: 6.5 [PH]
PLATELET # BLD AUTO: 212 THOUSANDS/UL (ref 149–390)
PMV BLD AUTO: 10.9 FL (ref 8.9–12.7)
POTASSIUM SERPL-SCNC: 4.3 MMOL/L (ref 3.5–5.3)
PROT SERPL-MCNC: 6.7 G/DL (ref 6.4–8.4)
PROT UR STRIP-MCNC: NEGATIVE MG/DL
RBC # BLD AUTO: 5.29 MILLION/UL (ref 3.88–5.62)
RBC #/AREA URNS AUTO: NORMAL /HPF
SODIUM SERPL-SCNC: 139 MMOL/L (ref 135–147)
SP GR UR STRIP.AUTO: 1.03 (ref 1–1.03)
UROBILINOGEN UR STRIP-ACNC: <2 MG/DL
WBC # BLD AUTO: 4.68 THOUSAND/UL (ref 4.31–10.16)
WBC #/AREA URNS AUTO: NORMAL /HPF

## 2024-04-28 PROCEDURE — 74177 CT ABD & PELVIS W/CONTRAST: CPT

## 2024-04-28 PROCEDURE — 85025 COMPLETE CBC W/AUTO DIFF WBC: CPT

## 2024-04-28 PROCEDURE — 83690 ASSAY OF LIPASE: CPT

## 2024-04-28 PROCEDURE — 80053 COMPREHEN METABOLIC PANEL: CPT

## 2024-04-28 PROCEDURE — 99285 EMERGENCY DEPT VISIT HI MDM: CPT | Performed by: EMERGENCY MEDICINE

## 2024-04-28 PROCEDURE — 36415 COLL VENOUS BLD VENIPUNCTURE: CPT

## 2024-04-28 PROCEDURE — 96376 TX/PRO/DX INJ SAME DRUG ADON: CPT

## 2024-04-28 PROCEDURE — 81001 URINALYSIS AUTO W/SCOPE: CPT

## 2024-04-28 PROCEDURE — 96375 TX/PRO/DX INJ NEW DRUG ADDON: CPT

## 2024-04-28 PROCEDURE — 96366 THER/PROPH/DIAG IV INF ADDON: CPT

## 2024-04-28 PROCEDURE — 96365 THER/PROPH/DIAG IV INF INIT: CPT

## 2024-04-28 PROCEDURE — 99284 EMERGENCY DEPT VISIT MOD MDM: CPT

## 2024-04-28 RX ORDER — TAMSULOSIN HYDROCHLORIDE 0.4 MG/1
0.4 CAPSULE ORAL
Qty: 10 CAPSULE | Refills: 0 | Status: SHIPPED | OUTPATIENT
Start: 2024-04-28

## 2024-04-28 RX ORDER — KETOROLAC TROMETHAMINE 30 MG/ML
15 INJECTION, SOLUTION INTRAMUSCULAR; INTRAVENOUS ONCE
Status: COMPLETED | OUTPATIENT
Start: 2024-04-28 | End: 2024-04-28

## 2024-04-28 RX ORDER — OXYCODONE HYDROCHLORIDE 5 MG/1
5 TABLET ORAL EVERY 6 HOURS PRN
Qty: 20 TABLET | Refills: 0 | Status: SHIPPED | OUTPATIENT
Start: 2024-04-28

## 2024-04-28 RX ORDER — HYDROMORPHONE HCL/PF 1 MG/ML
0.5 SYRINGE (ML) INJECTION ONCE
Status: COMPLETED | OUTPATIENT
Start: 2024-04-28 | End: 2024-04-28

## 2024-04-28 RX ORDER — SODIUM CHLORIDE, SODIUM GLUCONATE, SODIUM ACETATE, POTASSIUM CHLORIDE, MAGNESIUM CHLORIDE, SODIUM PHOSPHATE, DIBASIC, AND POTASSIUM PHOSPHATE .53; .5; .37; .037; .03; .012; .00082 G/100ML; G/100ML; G/100ML; G/100ML; G/100ML; G/100ML; G/100ML
1000 INJECTION, SOLUTION INTRAVENOUS ONCE
Status: COMPLETED | OUTPATIENT
Start: 2024-04-28 | End: 2024-04-28

## 2024-04-28 RX ADMIN — IOHEXOL 100 ML: 350 INJECTION, SOLUTION INTRAVENOUS at 12:04

## 2024-04-28 RX ADMIN — HYDROMORPHONE HYDROCHLORIDE 0.5 MG: 1 INJECTION, SOLUTION INTRAMUSCULAR; INTRAVENOUS; SUBCUTANEOUS at 13:50

## 2024-04-28 RX ADMIN — SODIUM CHLORIDE, SODIUM GLUCONATE, SODIUM ACETATE, POTASSIUM CHLORIDE, MAGNESIUM CHLORIDE, SODIUM PHOSPHATE, DIBASIC, AND POTASSIUM PHOSPHATE 1000 ML: .53; .5; .37; .037; .03; .012; .00082 INJECTION, SOLUTION INTRAVENOUS at 11:23

## 2024-04-28 RX ADMIN — KETOROLAC TROMETHAMINE 15 MG: 30 INJECTION, SOLUTION INTRAMUSCULAR; INTRAVENOUS at 10:33

## 2024-04-28 RX ADMIN — HYDROMORPHONE HYDROCHLORIDE 0.5 MG: 1 INJECTION, SOLUTION INTRAMUSCULAR; INTRAVENOUS; SUBCUTANEOUS at 10:29

## 2024-04-28 NOTE — DISCHARGE INSTRUCTIONS
Take Tylenol Motrin on the clock  Oxy codone for breakthrough pain, hydrate well  Lulú for 10 days, urology follow-up outpatient if you have worsening symptoms come back to the ED.

## 2024-04-28 NOTE — ED ATTENDING ATTESTATION
4/28/2024  I, Lucas Crook MD, saw and evaluated the patient. I have discussed the patient with the resident/non-physician practitioner and agree with the resident's/non-physician practitioner's findings, Plan of Care, and MDM as documented in the resident's/non-physician practitioner's note, except where noted. All available labs and Radiology studies were reviewed.  I was present for key portions of any procedure(s) performed by the resident/non-physician practitioner and I was immediately available to provide assistance.       At this point I agree with the current assessment done in the Emergency Department.  I have conducted an independent evaluation of this patient a history and physical is as follows:  Patient presents with complaints of dysuria pressure in the bladder area lower abdominal pain  No fever no chills some nausea no vomiting no diarrhea  Prior abdominal surgery including hernia repair  Exam the patient is uncomfortable appearing his vital signs are stable he is afebrile sclera anicteric mucous memories moist lungs clear heart regular abdomen soft positive bowel sounds tenderness in the suprapubic area no CVA tenderness noted    CT scan shows a 6 mm UVJ stone that is almost entirely in the bladder  Mild hydroureter    Pain is controlled    Urine negative for infection  Renal function normal white count normal    Follow-up urology ambulatory referral placed  Oxycodone for pain  Zofran for nausea  For return precautions  ED Course         Critical Care Time  Procedures

## 2024-04-28 NOTE — ED PROVIDER NOTES
History  Chief Complaint   Patient presents with    Penis Pain     Started yesterday with penis pain. No blood when urinating. No back pain no abd pain no n/v      HPI  MDM  Pt is a 53 Y O M, hx of kidney stones presents for left groin pain that started couple of days ago.  No associated nausea vomiting or urinary changes.    Initial presentation pt is uncomfortable secondary to pain    On exam   General: VSS, NAD, awake, alert. Talking normally.   Head: Normocephalic, atraumatic, nontender.  Eyes: EOM-No subconjunctival hemorrhages.  ENT: Nose atraumatic. MMM  No malocclusion. No stridor. Normal phonation. No drooling. Normal swallowing.   Neck: Trachea midline. No JVD.  CV: RRR.   Lungs: CTAB No tachypnea. No paradoxical motion.  Abd: Moderate size abdominal tenderness no guarding/rigidity.   MSK: Full ROM throughout. No lower extremity edema.   Skin: Dry, intact.   Neuro: AAOx3, GCS 15, CN II-XII grossly intact. Motor/sensory grossly intact.  Psychiatric/Behavioral: mood/affect normal; behavior normal; thought content normal; judgement normal   Exam: deferred        Ddx: Renal pathology, SBO,pyelo    Plan: Patient was evaluated with UA and abdominal labs including CBC BMP lipase and also CT abdomen pelvis.  Imaging remarkable for stone almost in the left UVJ junction.  Stone likely passed into bladder, patient had symptomatic relief after pain management.  Urinary analysis unremarkable for infection.  Patient discharged with outpatient urology follow-up and was get pain medicine at [facility].  Discussed return cautions.      Final Dispo:     Pt is hemodynamically stable and clear for discharge with outpatient f/u with their PCP. Return precautions given pt verbalized understanding      Prior to Admission Medications   Prescriptions Last Dose Informant Patient Reported? Taking?   Diclofenac Sodium (VOLTAREN) 1 %   No No   Sig: Apply 2 g topically 4 (four) times a day For lower back pain.   aspirin (ECOTRIN LOW  STRENGTH) 81 mg EC tablet   Yes No   Sig: Take 81 mg by mouth daily   cholecalciferol (VITAMIN D3) 1,000 units tablet   No No   Sig: Take 1 tablet (1,000 Units total) by mouth daily   gabapentin (Neurontin) 100 mg capsule   No No   Sig: Take 3 capsules (300 mg total) by mouth daily at bedtime   tadalafil (CIALIS) 5 MG tablet   No No   Sig: Take 1 tablet (5 mg total) by mouth in the morning      Facility-Administered Medications: None       No past medical history on file.    Past Surgical History:   Procedure Laterality Date    BLADDER STONE REMOVAL         No family history on file.  I have reviewed and agree with the history as documented.    E-Cigarette/Vaping    E-Cigarette Use Never User      E-Cigarette/Vaping Substances    Nicotine No     THC No     CBD No     Flavoring No     Other No     Unknown No      Social History     Tobacco Use    Smoking status: Never    Smokeless tobacco: Never   Vaping Use    Vaping status: Never Used   Substance Use Topics    Alcohol use: Not Currently    Drug use: Never        Review of Systems    Physical Exam  ED Triage Vitals   Temperature Pulse Respirations Blood Pressure SpO2   04/28/24 0916 04/28/24 0916 04/28/24 0916 04/28/24 0916 04/28/24 0916   97.6 °F (36.4 °C) 75 20 135/86 95 %      Temp src Heart Rate Source Patient Position - Orthostatic VS BP Location FiO2 (%)   -- 04/28/24 1121 -- 04/28/24 1121 --    Monitor  Left arm       Pain Score       04/28/24 0916       10 - Worst Possible Pain             Orthostatic Vital Signs  Vitals:    04/28/24 0916 04/28/24 1121 04/28/24 1330   BP: 135/86 114/66 113/72   Pulse: 75 70 67       Physical Exam    ED Medications  Medications   HYDROmorphone (DILAUDID) injection 0.5 mg (0.5 mg Intravenous Given 4/28/24 1029)   ketorolac (TORADOL) injection 15 mg (15 mg Intravenous Given 4/28/24 1033)   multi-electrolyte (ISOLYTE-S PH 7.4) bolus 1,000 mL (0 mL Intravenous Stopped 4/28/24 1257)   iohexol (OMNIPAQUE) 350 MG/ML injection  (MULTI-DOSE) 100 mL (100 mL Intravenous Given 4/28/24 1204)   HYDROmorphone (DILAUDID) injection 0.5 mg (0.5 mg Intravenous Given 4/28/24 1350)       Diagnostic Studies  Results Reviewed       Procedure Component Value Units Date/Time    Urine Microscopic [[id number]]  (Normal) Collected: 04/28/24 1259    Lab Status: Final result Specimen: Urine, Clean Catch Updated: 04/28/24 1312     RBC, UA 1-2 /hpf      WBC, UA None Seen /hpf      Epithelial Cells None Seen /hpf      Bacteria, UA None Seen /hpf     UA w Reflex to Microscopic w Reflex to Culture [[id number]]  (Abnormal) Collected: 04/28/24 1259    Lab Status: Final result Specimen: Urine, Clean Catch Updated: 04/28/24 1312     Color, UA Colorless     Clarity, UA Clear     Specific Gravity, UA 1.029     pH, UA 6.5     Leukocytes, UA Negative     Nitrite, UA Negative     Protein, UA Negative mg/dl      Glucose, UA Negative mg/dl      Ketones, UA Negative mg/dl      Urobilinogen, UA <2.0 mg/dl      Bilirubin, UA Negative     Occult Blood, UA Small    Comprehensive metabolic panel [[id number]] Collected: 04/28/24 1037    Lab Status: Final result Specimen: Blood from Arm, Left Updated: 04/28/24 1113     Sodium 139 mmol/L      Potassium 4.3 mmol/L      Chloride 107 mmol/L      CO2 26 mmol/L      ANION GAP 6 mmol/L      BUN 23 mg/dL      Creatinine 0.94 mg/dL      Glucose 92 mg/dL      Calcium 9.1 mg/dL      AST 19 U/L      ALT 25 U/L      Alkaline Phosphatase 46 U/L      Total Protein 6.7 g/dL      Albumin 4.3 g/dL      Total Bilirubin 0.83 mg/dL      eGFR 91 ml/min/1.73sq m     Narrative:      National Kidney Disease Foundation guidelines for Chronic Kidney Disease (CKD):     Stage 1 with normal or high GFR (GFR > 90 mL/min/1.73 square meters)    Stage 2 Mild CKD (GFR = 60-89 mL/min/1.73 square meters)    Stage 3A Moderate CKD (GFR = 45-59 mL/min/1.73 square meters)    Stage 3B Moderate CKD (GFR = 30-44 mL/min/1.73 square meters)    Stage 4 Severe CKD (GFR = 15-29  mL/min/1.73 square meters)    Stage 5 End Stage CKD (GFR <15 mL/min/1.73 square meters)  Note: GFR calculation is accurate only with a steady state creatinine    Lipase [[id number]]  (Normal) Collected: 04/28/24 1037    Lab Status: Final result Specimen: Blood from Arm, Left Updated: 04/28/24 1113     Lipase 30 u/L     CBC and differential [[id number]] Collected: 04/28/24 1037    Lab Status: Final result Specimen: Blood from Arm, Left Updated: 04/28/24 1052     WBC 4.68 Thousand/uL      RBC 5.29 Million/uL      Hemoglobin 16.5 g/dL      Hematocrit 47.7 %      MCV 90 fL      MCH 31.2 pg      MCHC 34.6 g/dL      RDW 11.8 %      MPV 10.9 fL      Platelets 212 Thousands/uL      nRBC 0 /100 WBCs      Segmented % 59 %      Immature Grans % 0 %      Lymphocytes % 30 %      Monocytes % 6 %      Eosinophils Relative 4 %      Basophils Relative 1 %      Absolute Neutrophils 2.73 Thousands/µL      Absolute Immature Grans 0.01 Thousand/uL      Absolute Lymphocytes 1.41 Thousands/µL      Absolute Monocytes 0.30 Thousand/µL      Eosinophils Absolute 0.20 Thousand/µL      Basophils Absolute 0.03 Thousands/µL                    CT abdomen pelvis with contrast   Final Result by Rayo Penaloza MD (04/28 1251)      Left ureterovesicular junction 6 mm calculus with associated mild hydroureter and urothelial enhancement of the left distal ureter which may be secondary to recent stone passage versus ascending infection.      Additional nonobstructing right renal lower pole 5 mm calculus. Recommend urologic follow up.         The study was marked in EPIC for immediate notification.      I have personally reviewed this study including all images.  / R.J.F.         Resident: RONAK DIA I, the attending radiologist, have reviewed the images and agree with the final report above.      Workstation performed: [id number]               Procedures  Procedures      ED Course  ED Course as of 05/07/24 1200   Sun Apr 28, 2024   1237 Large  stone in bladder                                       Medical Decision Making  Amount and/or Complexity of Data Reviewed  Labs: ordered.  Radiology: ordered.    Risk  Prescription drug management.          Disposition  Final diagnoses:   Kidney stone     Time reflects when diagnosis was documented in both MDM as applicable and the Disposition within this note       Time User Action Codes Description Comment    4/28/2024  1:47 PM Lucas Crook Add [N20.0] Kidney stone           ED Disposition       ED Disposition   Discharge    Condition   Stable    Date/Time   Sun Apr 28, 2024  2:11 PM    Comment   Hiral Cullen discharge to home/self care.                   Follow-up Information       Follow up With Specialties Details Why Contact Info Additional Information    [facility] In 1 week  [address] [address]  [phone] [facility], [address], [place], [address]   [phone]    [facility] Emergency Department Emergency Medicine  If symptoms worsen [address] [address]  [phone] [facility], [address], [place], [address]   [phone]            Discharge Medication List as of 4/28/2024  2:12 PM        START taking these medications    Details   oxyCODONE (Roxicodone) 5 immediate release tablet Take 1 tablet (5 mg total) by mouth every 6 (six) hours as needed for moderate pain for up to 20 doses Max Daily Amount: 20 mg, Starting Sun 4/28/2024, Normal      tamsulosin (FLOMAX) 0.4 mg Take 1 capsule (0.4 mg total) by mouth daily with dinner, Starting Sun 4/28/2024, Normal           CONTINUE these medications which have NOT CHANGED    Details   aspirin (ECOTRIN LOW STRENGTH) 81 mg EC tablet Take 81 mg by mouth daily, Historical Med      cholecalciferol  (VITAMIN D3) 1,000 units tablet Take 1 tablet (1,000 Units total) by mouth daily, Starting Thu 3/28/2024, Until Fri 7/26/2024, Normal      Diclofenac Sodium (VOLTAREN) 1 % Apply 2 g topically 4 (four) times a day For lower back pain., Starting Wed 11/22/2023, Normal      gabapentin (Neurontin) 100 mg capsule Take 3 capsules (300 mg total) by mouth daily at bedtime, Starting Thu 3/28/2024, Until Fri 7/26/2024, Normal      tadalafil (CIALIS) 5 MG tablet Take 1 tablet (5 mg total) by mouth in the morning, Starting Fri 2/2/2024, Until Sun 3/3/2024, Normal               PDMP Review       None             ED Provider  Attending physically available and evaluated Teenadong JESUS RamirezVallevidya Cullen. I managed the patient along with the ED Attending.    Electronically Signed by           Susie Fletcher DO  05/07/24 Devan

## 2024-05-05 DIAGNOSIS — M54.9 BACK PAIN: Primary | ICD-10-CM

## 2024-05-05 RX ORDER — CELECOXIB 200 MG/1
200 CAPSULE ORAL 2 TIMES DAILY
Qty: 28 CAPSULE | Refills: 0 | Status: SHIPPED | OUTPATIENT
Start: 2024-05-05 | End: 2024-05-19

## 2024-05-30 ENCOUNTER — OFFICE VISIT (OUTPATIENT)
Dept: INTERNAL MEDICINE CLINIC | Facility: CLINIC | Age: 54
End: 2024-05-30

## 2024-05-30 VITALS
DIASTOLIC BLOOD PRESSURE: 57 MMHG | WEIGHT: 195 LBS | RESPIRATION RATE: 18 BRPM | SYSTOLIC BLOOD PRESSURE: 96 MMHG | HEIGHT: 67 IN | TEMPERATURE: 97.5 F | HEART RATE: 64 BPM | BODY MASS INDEX: 30.61 KG/M2

## 2024-05-30 DIAGNOSIS — G89.29 CHRONIC LEFT-SIDED LOW BACK PAIN WITH LEFT-SIDED SCIATICA: Primary | ICD-10-CM

## 2024-05-30 DIAGNOSIS — Z12.11 COLON CANCER SCREENING: ICD-10-CM

## 2024-05-30 DIAGNOSIS — N20.0 KIDNEY STONE: ICD-10-CM

## 2024-05-30 DIAGNOSIS — R30.0 DYSURIA: ICD-10-CM

## 2024-05-30 DIAGNOSIS — M54.42 CHRONIC LEFT-SIDED LOW BACK PAIN WITH LEFT-SIDED SCIATICA: Primary | ICD-10-CM

## 2024-05-30 DIAGNOSIS — N40.0 BENIGN PROSTATIC HYPERPLASIA, UNSPECIFIED WHETHER LOWER URINARY TRACT SYMPTOMS PRESENT: ICD-10-CM

## 2024-05-30 LAB
SL AMB  POCT GLUCOSE, UA: NEGATIVE
SL AMB LEUKOCYTE ESTERASE,UA: NEGATIVE
SL AMB POCT BILIRUBIN,UA: NEGATIVE
SL AMB POCT BLOOD,UA: NORMAL
SL AMB POCT CLARITY,UA: CLEAR
SL AMB POCT COLOR,UA: YELLOW
SL AMB POCT KETONES,UA: NEGATIVE
SL AMB POCT NITRITE,UA: NEGATIVE
SL AMB POCT PH,UA: 5.5
SL AMB POCT SPECIFIC GRAVITY,UA: 1.03
SL AMB POCT URINE PROTEIN: NEGATIVE
SL AMB POCT UROBILINOGEN: 0.2

## 2024-05-30 PROCEDURE — 99213 OFFICE O/P EST LOW 20 MIN: CPT | Performed by: STUDENT IN AN ORGANIZED HEALTH CARE EDUCATION/TRAINING PROGRAM

## 2024-05-30 PROCEDURE — 81002 URINALYSIS NONAUTO W/O SCOPE: CPT | Performed by: STUDENT IN AN ORGANIZED HEALTH CARE EDUCATION/TRAINING PROGRAM

## 2024-05-30 RX ORDER — TAMSULOSIN HYDROCHLORIDE 0.4 MG/1
0.4 CAPSULE ORAL
Qty: 10 CAPSULE | Refills: 0 | Status: SHIPPED | OUTPATIENT
Start: 2024-05-30

## 2024-05-30 RX ORDER — TADALAFIL 5 MG/1
5 TABLET ORAL DAILY
Qty: 30 TABLET | Refills: 0 | Status: SHIPPED | OUTPATIENT
Start: 2024-05-30 | End: 2024-06-29

## 2024-05-30 RX ORDER — GABAPENTIN 100 MG/1
CAPSULE ORAL
Qty: 120 CAPSULE | Refills: 3 | Status: SHIPPED | OUTPATIENT
Start: 2024-05-30 | End: 2024-09-27

## 2024-05-30 NOTE — PROGRESS NOTES
Ambulatory Visit  Name: Hiral Cullen      : 1970      MRN: [id number]  Encounter Provider: Kris Shell MD  Encounter Date: 2024   Encounter department: [facility]    Assessment & Plan   In summary, patient is a 53-year-old male with PMH of brittle cytosis, bilateral nonobstructive nephrolithiasis, BPH, chronic back pain presenting to the clinic for follow-up.  Exam notable for paraspinal tenderness, POCT dipstick positive for presence of blood, and improvement in his hemoglobin.  Plan to increase gabapentin with a referral to physical therapy, continue Cialis and Flomax for his BPH, referral to urology for his dysuria and nephrolithiasis with hematuria.     1. Chronic left-sided low back pain with left-sided sciatica  - Recent x-ray for his back pain showing moderate spondylosis and moderate facet disease of the lower lumbar spine.  Historically, he has also had left-sided sciatica but without muscle weakness or sensation changes.  Patient amenable to trying physical therapy first.  If this does not help, will refer him to orthopedics for possible injections/consideration for surgical intervention.  That being said, patient does see some relief from the gabapentin but still has ongoing pain throughout the day.  Will trial him on 100 mg in the daytime and continue to 300 mg at night.  If the patient does not feel to tired from the 100 mg in the morning, will increase this to 300 mg twice daily.  -     gabapentin (Neurontin) 100 mg capsule; Take 3 capsules (300 mg total) by mouth daily at bedtime AND 1 capsule (100 mg total) every morning.  -     Ambulatory Referral to Physical Therapy; Future  2. Dysuria  -     tadalafil (CIALIS) 5 MG tablet; Take 1 tablet (5 mg total) by mouth in the morning  -     Ambulatory Referral to Urology; Future  -     POCT urine dip  3. Benign prostatic hyperplasia, unspecified whether lower urinary tract symptoms present  -      tadalafil (CIALIS) 5 MG tablet; Take 1 tablet (5 mg total) by mouth in the morning  4. Kidney stone  - POCT urine dipstick revealing pH 5.5, 3+ blood, 200 rbcs.  This is concerning given that he had last passed a stone over a month ago.  Still endorses some dysuria but no gross hematuria.  Therefore, we will refer him to see a urologist for the ongoing hematuria for workup of possible bladder malignancy versus nephrolithiasis.  Also instructed patient to collect his urine with a toilet hat and then collect possible kidney stones for stone analysis.  -     tamsulosin (FLOMAX) 0.4 mg; Take 1 capsule (0.4 mg total) by mouth daily with dinner  -     Stone analysis; Future  -     POCT urine dip  5. Colon cancer screening  -     Cologuard    BMI Counseling: Body mass index is 30.54 kg/m². The BMI is above normal. Nutrition recommendations include decreasing portion sizes, encouraging healthy choices of fruits and vegetables, limiting drinks that contain sugar and reducing intake of cholesterol. No pharmacotherapy was ordered. Rationale for BMI follow-up plan is due to patient being overweight or obese.     Depression Screening and Follow-up Plan: Patient was screened for depression during today's encounter. They screened negative with a PHQ-2 score of 0.        History of Present Illness      [id number] used for this encounter. Hiral Jackson is a 53 y.o. male with a past medical history of erythrocytosis, bilateral nonobstructive nephrolithiasis without hydronephrosis, BPH, chronic back pain who presents to the clinic today because of ongoing back pain on the left side. Has not tried physical therapy but is considering it.  He was previously prescribed celecoxib as needed for the pain.  Patient states that he seldom takes some only when it is severe.  Otherwise, he has been taking his gabapentin 300 nightly as prescribed with some improvement but does not last throughout the day.  X-ray showed moderate  spondylosis and moderate facet disease of lower lumbar spine.  Otherwise, patient was in the ED 4/2024 for symptomatic nephrolithiasis with stone passage without intervention.  UA at that time demonstrating microscopic hematuria.  Since that time, patient reports resolution in his lower abdominal/suprapubic pain and denies hematuria and dysuria.  For the patient's history of erythrocytosis, of note, patient has a history of living in high elevation.  Therefore most likely due to reactive urosepsis.  In fact, last CBC showing improvement in hemoglobin now at 16.5 from 18.2.  Kidney function also appears to have improved with mild albuminuria of 25.  LFT and TSH were within normal limits.        Review of Systems   Constitutional:  Positive for fatigue. Negative for appetite change, chills, fever and unexpected weight change.   HENT:  Negative for nosebleeds, rhinorrhea and trouble swallowing.    Eyes:  Positive for redness and visual disturbance. Negative for photophobia.   Respiratory:  Negative for shortness of breath and wheezing.    Cardiovascular:  Negative for chest pain, palpitations and leg swelling.   Gastrointestinal:  Negative for abdominal pain, blood in stool, constipation, diarrhea, nausea and vomiting.   Genitourinary:  Positive for difficulty urinating and dysuria. Negative for hematuria.   Musculoskeletal:  Positive for back pain. Negative for neck pain.   Skin:  Negative for rash.   Neurological:  Negative for dizziness, light-headedness and headaches.     No past medical history on file.  Past Surgical History:   Procedure Laterality Date    BLADDER STONE REMOVAL       No family history on file.  Social History     Tobacco Use    Smoking status: Never    Smokeless tobacco: Never   Vaping Use    Vaping status: Never Used   Substance and Sexual Activity    Alcohol use: Not Currently    Drug use: Never    Sexual activity: Not on file     Current Outpatient Medications on File Prior to Visit  "  Medication Sig    aspirin (ECOTRIN LOW STRENGTH) 81 mg EC tablet Take 81 mg by mouth daily    celecoxib (CeleBREX) 200 mg capsule Take 1 capsule (200 mg total) by mouth 2 (two) times a day for 14 days    cholecalciferol (VITAMIN D3) 1,000 units tablet Take 1 tablet (1,000 Units total) by mouth daily    Diclofenac Sodium (VOLTAREN) 1 % Apply 2 g topically 4 (four) times a day For lower back pain.    oxyCODONE (Roxicodone) 5 immediate release tablet Take 1 tablet (5 mg total) by mouth every 6 (six) hours as needed for moderate pain for up to 20 doses Max Daily Amount: 20 mg    [DISCONTINUED] gabapentin (Neurontin) 100 mg capsule Take 3 capsules (300 mg total) by mouth daily at bedtime    [DISCONTINUED] tadalafil (CIALIS) 5 MG tablet Take 1 tablet (5 mg total) by mouth in the morning    [DISCONTINUED] tamsulosin (FLOMAX) 0.4 mg Take 1 capsule (0.4 mg total) by mouth daily with dinner     No Known Allergies    There is no immunization history on file for this patient.  Objective     BP 96/57 (BP Location: Left arm, Patient Position: Sitting, Cuff Size: Large)   Pulse 64   Temp 97.5 °F (36.4 °C) (Temporal)   Resp 18   Ht 5' 7\" (1.702 m)   Wt 88.5 kg (195 lb)   BMI 30.54 kg/m²     Physical Exam  Constitutional:       General: He is not in acute distress.     Appearance: He is normal weight. He is diaphoretic. He is not toxic-appearing.   HENT:      Head: Normocephalic and atraumatic.      Nose: Nose normal.      Mouth/Throat:      Mouth: Mucous membranes are moist.      Pharynx: Oropharynx is clear. No oropharyngeal exudate.      Comments: Enlarged tonsils and a deviated uvula  Eyes:      Pupils: Pupils are equal, round, and reactive to light.      Comments: Chronically red eyes   Cardiovascular:      Rate and Rhythm: Normal rate and regular rhythm.      Pulses: Normal pulses.      Heart sounds: Normal heart sounds.   Pulmonary:      Effort: Pulmonary effort is normal.      Breath sounds: Normal breath sounds. "   Abdominal:      General: Abdomen is flat. Bowel sounds are normal.      Tenderness: There is no abdominal tenderness. There is right CVA tenderness and left CVA tenderness. There is no guarding.      Comments: CVA tenderness most likely from paraspinal tenderness,   Musculoskeletal:         General: Normal range of motion.      Comments: No spinal tenderness   Skin:     General: Skin is warm.      Capillary Refill: Capillary refill takes less than 2 seconds.   Neurological:      General: No focal deficit present.      Mental Status: He is alert and oriented to person, place, and time.   Psychiatric:         Mood and Affect: Mood normal.         Behavior: Behavior normal.       Administrative Statements   I have spent a total time of 30 minutes on 05/30/24 In caring for this patient including Diagnostic results, Risks and benefits of tx options, Instructions for management, Patient and family education, Risk factor reductions, Impressions, and Reviewing / ordering tests, medicine, procedures  .

## 2024-05-30 NOTE — PATIENT INSTRUCTIONS
Please take the cialis and the flomax. It will help treat the enlarged prostate, causing you to have burning with urination  I will also make a referral for you to see a urologist.  They will determine if they need to do anything about your other kidney stone.  I will send Cologuard to your home. This will test if you might have colon cancer.  Please start physical therapy when you can.  Call the number on the paper that we gave you.  If this does not help your back pain, then we can refer you to orthopedics.  As I have instructed you, please collect one of your kidney stones and then either let the office know by calling or bring it to the lab.  At that point, we will run some test.  Please start taking gabapentin 100 mg in the morning and then 300 mg at night.  If you can tolerate the gabapentin in the morning, we can consider increasing it to 300.       Por favor tome cialis y flomax. Ayudará a tratar el agrandamiento de la próstata, que provoca ardor al orinar.   También le derivaré a un urólogo. Ellos determinarán si es necesario hacer algo con respecto a valdez otro cálculo renal.   Enviaré Cologuard a tu casa. [place] probará si usted podría tener cáncer de colon.   Por favor comience la fisioterapia cuando pueda. Llama al número que figura en el papel que te dimos. Si esto no ayuda con valdez dolor de espalda, podemos derivarlo a un ortopedista.   Kimberly le he indicado, recoja phillip de kaylee cálculos renales y luego infórmeselo a la oficina llamando o tráigalo al laboratorio. En jonas momento, realizaremos algunas pruebas.   Empiece a jonas gabapentina 100 mg por la mañana y luego 300 mg por la noche. Si puede tolerar la gabapentina por la mañana, podemos considerar aumentarla a 300.

## 2024-06-01 ENCOUNTER — HOSPITAL ENCOUNTER (INPATIENT)
Facility: HOSPITAL | Age: 54
LOS: 1 days | Discharge: HOME | DRG: 465 | End: 2024-06-03
Attending: EMERGENCY MEDICINE | Admitting: INTERNAL MEDICINE
Payer: COMMERCIAL

## 2024-06-01 DIAGNOSIS — N17.9 AKI (ACUTE KIDNEY INJURY) (HCC): ICD-10-CM

## 2024-06-01 DIAGNOSIS — N20.0 NEPHROLITHIASIS: Primary | ICD-10-CM

## 2024-06-01 LAB
ALBUMIN SERPL BCP-MCNC: 4.5 G/DL (ref 3.5–5)
ALP SERPL-CCNC: 53 U/L (ref 34–104)
ALT SERPL W P-5'-P-CCNC: 34 U/L (ref 7–52)
ANION GAP SERPL CALCULATED.3IONS-SCNC: 11 MMOL/L (ref 4–13)
AST SERPL W P-5'-P-CCNC: 25 U/L (ref 13–39)
BACTERIA UR QL AUTO: ABNORMAL /HPF
BASOPHILS # BLD AUTO: 0.07 THOUSANDS/ÂΜL (ref 0–0.1)
BASOPHILS NFR BLD AUTO: 1 % (ref 0–1)
BILIRUB SERPL-MCNC: 0.77 MG/DL (ref 0.2–1)
BILIRUB UR QL STRIP: NEGATIVE
BUN SERPL-MCNC: 25 MG/DL (ref 5–25)
CALCIUM SERPL-MCNC: 9.1 MG/DL (ref 8.4–10.2)
CHLORIDE SERPL-SCNC: 104 MMOL/L (ref 96–108)
CLARITY UR: CLEAR
CO2 SERPL-SCNC: 25 MMOL/L (ref 21–32)
COLOR UR: YELLOW
COLOR, POC: YELLOW
CREAT SERPL-MCNC: 1.53 MG/DL (ref 0.6–1.3)
EOSINOPHIL # BLD AUTO: 0.24 THOUSAND/ÂΜL (ref 0–0.61)
EOSINOPHIL NFR BLD AUTO: 2 % (ref 0–6)
ERYTHROCYTE [DISTWIDTH] IN BLOOD BY AUTOMATED COUNT: 12 % (ref 11.6–15.1)
GFR SERPL CREATININE-BSD FRML MDRD: 50 ML/MIN/1.73SQ M
GLUCOSE SERPL-MCNC: 157 MG/DL (ref 65–140)
GLUCOSE UR STRIP-MCNC: NEGATIVE MG/DL
HCT VFR BLD AUTO: 47.5 % (ref 36.5–49.3)
HGB BLD-MCNC: 16.8 G/DL (ref 12–17)
HGB UR QL STRIP.AUTO: ABNORMAL
IMM GRANULOCYTES # BLD AUTO: 0.05 THOUSAND/UL (ref 0–0.2)
IMM GRANULOCYTES NFR BLD AUTO: 0 % (ref 0–2)
KETONES UR STRIP-MCNC: NEGATIVE MG/DL
LEUKOCYTE ESTERASE UR QL STRIP: NEGATIVE
LYMPHOCYTES # BLD AUTO: 2.3 THOUSANDS/ÂΜL (ref 0.6–4.47)
LYMPHOCYTES NFR BLD AUTO: 19 % (ref 14–44)
MCH RBC QN AUTO: 31.8 PG (ref 26.8–34.3)
MCHC RBC AUTO-ENTMCNC: 35.4 G/DL (ref 31.4–37.4)
MCV RBC AUTO: 90 FL (ref 82–98)
MONOCYTES # BLD AUTO: 0.9 THOUSAND/ÂΜL (ref 0.17–1.22)
MONOCYTES NFR BLD AUTO: 7 % (ref 4–12)
NEUTROPHILS # BLD AUTO: 8.71 THOUSANDS/ÂΜL (ref 1.85–7.62)
NEUTS SEG NFR BLD AUTO: 71 % (ref 43–75)
NITRITE UR QL STRIP: NEGATIVE
NON-SQ EPI CELLS URNS QL MICRO: ABNORMAL /HPF
NRBC BLD AUTO-RTO: 0 /100 WBCS
PH UR STRIP.AUTO: 6.5 [PH] (ref 4.5–8)
PLATELET # BLD AUTO: 197 THOUSANDS/UL (ref 149–390)
PMV BLD AUTO: 10.8 FL (ref 8.9–12.7)
POTASSIUM SERPL-SCNC: 3.8 MMOL/L (ref 3.5–5.3)
PROT SERPL-MCNC: 6.8 G/DL (ref 6.4–8.4)
PROT UR STRIP-MCNC: NEGATIVE MG/DL
RBC # BLD AUTO: 5.29 MILLION/UL (ref 3.88–5.62)
RBC #/AREA URNS AUTO: ABNORMAL /HPF
SODIUM SERPL-SCNC: 140 MMOL/L (ref 135–147)
SP GR UR STRIP.AUTO: 1.02 (ref 1–1.03)
UROBILINOGEN UR QL STRIP.AUTO: 0.2 E.U./DL
WBC # BLD AUTO: 12.27 THOUSAND/UL (ref 4.31–10.16)
WBC #/AREA URNS AUTO: ABNORMAL /HPF

## 2024-06-01 PROCEDURE — 99284 EMERGENCY DEPT VISIT MOD MDM: CPT

## 2024-06-01 PROCEDURE — 85025 COMPLETE CBC W/AUTO DIFF WBC: CPT

## 2024-06-01 PROCEDURE — 99285 EMERGENCY DEPT VISIT HI MDM: CPT | Performed by: EMERGENCY MEDICINE

## 2024-06-01 PROCEDURE — 93005 ELECTROCARDIOGRAM TRACING: CPT

## 2024-06-01 PROCEDURE — 96375 TX/PRO/DX INJ NEW DRUG ADDON: CPT

## 2024-06-01 PROCEDURE — 36415 COLL VENOUS BLD VENIPUNCTURE: CPT

## 2024-06-01 PROCEDURE — 96365 THER/PROPH/DIAG IV INF INIT: CPT

## 2024-06-01 PROCEDURE — 81001 URINALYSIS AUTO W/SCOPE: CPT

## 2024-06-01 PROCEDURE — 80053 COMPREHEN METABOLIC PANEL: CPT

## 2024-06-01 RX ORDER — HYDROMORPHONE HCL/PF 1 MG/ML
0.5 SYRINGE (ML) INJECTION ONCE
Status: COMPLETED | OUTPATIENT
Start: 2024-06-01 | End: 2024-06-01

## 2024-06-01 RX ORDER — SODIUM CHLORIDE, SODIUM GLUCONATE, SODIUM ACETATE, POTASSIUM CHLORIDE, MAGNESIUM CHLORIDE, SODIUM PHOSPHATE, DIBASIC, AND POTASSIUM PHOSPHATE .53; .5; .37; .037; .03; .012; .00082 G/100ML; G/100ML; G/100ML; G/100ML; G/100ML; G/100ML; G/100ML
1000 INJECTION, SOLUTION INTRAVENOUS ONCE
Status: COMPLETED | OUTPATIENT
Start: 2024-06-01 | End: 2024-06-02

## 2024-06-01 RX ORDER — ONDANSETRON 2 MG/ML
4 INJECTION INTRAMUSCULAR; INTRAVENOUS ONCE
Status: COMPLETED | OUTPATIENT
Start: 2024-06-01 | End: 2024-06-01

## 2024-06-01 RX ORDER — KETOROLAC TROMETHAMINE 30 MG/ML
15 INJECTION, SOLUTION INTRAMUSCULAR; INTRAVENOUS ONCE
Status: COMPLETED | OUTPATIENT
Start: 2024-06-01 | End: 2024-06-01

## 2024-06-01 RX ADMIN — KETOROLAC TROMETHAMINE 15 MG: 30 INJECTION, SOLUTION INTRAMUSCULAR; INTRAVENOUS at 23:06

## 2024-06-01 RX ADMIN — ONDANSETRON 4 MG: 2 INJECTION INTRAMUSCULAR; INTRAVENOUS at 23:06

## 2024-06-01 RX ADMIN — HYDROMORPHONE HYDROCHLORIDE 0.5 MG: 1 INJECTION, SOLUTION INTRAMUSCULAR; INTRAVENOUS; SUBCUTANEOUS at 23:04

## 2024-06-01 RX ADMIN — SODIUM CHLORIDE, SODIUM GLUCONATE, SODIUM ACETATE, POTASSIUM CHLORIDE, MAGNESIUM CHLORIDE, SODIUM PHOSPHATE, DIBASIC, AND POTASSIUM PHOSPHATE 1000 ML: .53; .5; .37; .037; .03; .012; .00082 INJECTION, SOLUTION INTRAVENOUS at 23:15

## 2024-06-01 NOTE — LETTER
[facility]  [address] [facility] [address]  Dept: [phone]    Emily 3, 2024     Patient: Hiral Cullen   YOB: 1970   Date of Visit: 6/1/2024       To Whom it May Concern:    Hiral Cullen is under my professional care. He was seen in the hospital from 6/1/2024 to 06/03/24. He may return to work on 06/05 without limitations.    If you have any questions or concerns, please don't hesitate to call.         Sincerely,          Nguyễn Hayden MD

## 2024-06-02 ENCOUNTER — APPOINTMENT (OUTPATIENT)
Dept: RADIOLOGY | Facility: HOSPITAL | Age: 54
DRG: 465 | End: 2024-06-02
Payer: COMMERCIAL

## 2024-06-02 ENCOUNTER — ANESTHESIA EVENT (OUTPATIENT)
Dept: PERIOP | Facility: HOSPITAL | Age: 54
DRG: 465 | End: 2024-06-02
Payer: COMMERCIAL

## 2024-06-02 ENCOUNTER — ANESTHESIA (OUTPATIENT)
Dept: PERIOP | Facility: HOSPITAL | Age: 54
DRG: 465 | End: 2024-06-02
Payer: COMMERCIAL

## 2024-06-02 PROBLEM — N17.9 AKI (ACUTE KIDNEY INJURY) (HCC): Status: ACTIVE | Noted: 2024-06-02

## 2024-06-02 PROBLEM — D75.1 POLYCYTHEMIA: Status: ACTIVE | Noted: 2024-06-02

## 2024-06-02 PROBLEM — N20.0 NEPHROLITHIASIS: Status: ACTIVE | Noted: 2024-06-02

## 2024-06-02 LAB
ANION GAP SERPL CALCULATED.3IONS-SCNC: 6 MMOL/L (ref 4–13)
ATRIAL RATE: 61 BPM
BASOPHILS # BLD AUTO: 0.05 THOUSANDS/ÂΜL (ref 0–0.1)
BASOPHILS NFR BLD AUTO: 1 % (ref 0–1)
BUN SERPL-MCNC: 26 MG/DL (ref 5–25)
CALCIUM SERPL-MCNC: 8.3 MG/DL (ref 8.4–10.2)
CHLORIDE SERPL-SCNC: 104 MMOL/L (ref 96–108)
CO2 SERPL-SCNC: 26 MMOL/L (ref 21–32)
CREAT SERPL-MCNC: 1.72 MG/DL (ref 0.6–1.3)
EOSINOPHIL # BLD AUTO: 0.03 THOUSAND/ÂΜL (ref 0–0.61)
EOSINOPHIL NFR BLD AUTO: 0 % (ref 0–6)
ERYTHROCYTE [DISTWIDTH] IN BLOOD BY AUTOMATED COUNT: 12.1 % (ref 11.6–15.1)
GFR SERPL CREATININE-BSD FRML MDRD: 44 ML/MIN/1.73SQ M
GLUCOSE SERPL-MCNC: 123 MG/DL (ref 65–140)
HCT VFR BLD AUTO: 45.3 % (ref 36.5–49.3)
HGB BLD-MCNC: 15.7 G/DL (ref 12–17)
IMM GRANULOCYTES # BLD AUTO: 0.02 THOUSAND/UL (ref 0–0.2)
IMM GRANULOCYTES NFR BLD AUTO: 0 % (ref 0–2)
LYMPHOCYTES # BLD AUTO: 1.06 THOUSANDS/ÂΜL (ref 0.6–4.47)
LYMPHOCYTES NFR BLD AUTO: 12 % (ref 14–44)
MCH RBC QN AUTO: 31.4 PG (ref 26.8–34.3)
MCHC RBC AUTO-ENTMCNC: 34.7 G/DL (ref 31.4–37.4)
MCV RBC AUTO: 91 FL (ref 82–98)
MONOCYTES # BLD AUTO: 0.67 THOUSAND/ÂΜL (ref 0.17–1.22)
MONOCYTES NFR BLD AUTO: 8 % (ref 4–12)
NEUTROPHILS # BLD AUTO: 6.7 THOUSANDS/ÂΜL (ref 1.85–7.62)
NEUTS SEG NFR BLD AUTO: 79 % (ref 43–75)
NRBC BLD AUTO-RTO: 0 /100 WBCS
P AXIS: 54 DEGREES
PLATELET # BLD AUTO: 178 THOUSANDS/UL (ref 149–390)
PMV BLD AUTO: 11.1 FL (ref 8.9–12.7)
POTASSIUM SERPL-SCNC: 4.1 MMOL/L (ref 3.5–5.3)
PR INTERVAL: 188 MS
QRS AXIS: 91 DEGREES
QRSD INTERVAL: 90 MS
QT INTERVAL: 392 MS
QTC INTERVAL: 394 MS
RBC # BLD AUTO: 5 MILLION/UL (ref 3.88–5.62)
SODIUM SERPL-SCNC: 136 MMOL/L (ref 135–147)
T WAVE AXIS: 43 DEGREES
VENTRICULAR RATE: 61 BPM
WBC # BLD AUTO: 8.53 THOUSAND/UL (ref 4.31–10.16)

## 2024-06-02 PROCEDURE — 85025 COMPLETE CBC W/AUTO DIFF WBC: CPT

## 2024-06-02 PROCEDURE — 52356 CYSTO/URETERO W/LITHOTRIPSY: CPT | Performed by: UROLOGY

## 2024-06-02 PROCEDURE — 99255 IP/OBS CONSLTJ NEW/EST HI 80: CPT | Performed by: UROLOGY

## 2024-06-02 PROCEDURE — 0TF38ZZ FRAGMENTATION IN RIGHT KIDNEY PELVIS, VIA NATURAL OR ARTIFICIAL OPENING ENDOSCOPIC: ICD-10-PCS | Performed by: UROLOGY

## 2024-06-02 PROCEDURE — 87086 URINE CULTURE/COLONY COUNT: CPT | Performed by: UROLOGY

## 2024-06-02 PROCEDURE — C1894 INTRO/SHEATH, NON-LASER: HCPCS | Performed by: UROLOGY

## 2024-06-02 PROCEDURE — BT1D1ZZ FLUOROSCOPY OF RIGHT KIDNEY, URETER AND BLADDER USING LOW OSMOLAR CONTRAST: ICD-10-PCS | Performed by: UROLOGY

## 2024-06-02 PROCEDURE — 0T768DZ DILATION OF RIGHT URETER WITH INTRALUMINAL DEVICE, VIA NATURAL OR ARTIFICIAL OPENING ENDOSCOPIC: ICD-10-PCS | Performed by: UROLOGY

## 2024-06-02 PROCEDURE — 99024 POSTOP FOLLOW-UP VISIT: CPT | Performed by: UROLOGY

## 2024-06-02 PROCEDURE — 93010 ELECTROCARDIOGRAM REPORT: CPT | Performed by: INTERNAL MEDICINE

## 2024-06-02 PROCEDURE — 74176 CT ABD & PELVIS W/O CONTRAST: CPT

## 2024-06-02 PROCEDURE — 80048 BASIC METABOLIC PNL TOTAL CA: CPT

## 2024-06-02 PROCEDURE — C1758 CATHETER, URETERAL: HCPCS | Performed by: UROLOGY

## 2024-06-02 PROCEDURE — C1769 GUIDE WIRE: HCPCS | Performed by: UROLOGY

## 2024-06-02 PROCEDURE — 96366 THER/PROPH/DIAG IV INF ADDON: CPT

## 2024-06-02 PROCEDURE — 74420 UROGRAPHY RTRGR +-KUB: CPT

## 2024-06-02 PROCEDURE — C2617 STENT, NON-COR, TEM W/O DEL: HCPCS | Performed by: UROLOGY

## 2024-06-02 DEVICE — INLAY OPTIMA URETERAL STENT W/O GUIDEWIRE
Type: IMPLANTABLE DEVICE | Site: URETER | Status: FUNCTIONAL
Brand: BARD® INLAY OPTIMA® URETERAL STENT

## 2024-06-02 RX ORDER — OXYBUTYNIN CHLORIDE 5 MG/1
5 TABLET ORAL 2 TIMES DAILY
Status: DISCONTINUED | OUTPATIENT
Start: 2024-06-02 | End: 2024-06-03 | Stop reason: HOSPADM

## 2024-06-02 RX ORDER — ONDANSETRON 2 MG/ML
4 INJECTION INTRAMUSCULAR; INTRAVENOUS ONCE AS NEEDED
Status: DISCONTINUED | OUTPATIENT
Start: 2024-06-02 | End: 2024-06-02 | Stop reason: HOSPADM

## 2024-06-02 RX ORDER — FENTANYL CITRATE/PF 50 MCG/ML
50 SYRINGE (ML) INJECTION
Status: DISCONTINUED | OUTPATIENT
Start: 2024-06-02 | End: 2024-06-02 | Stop reason: HOSPADM

## 2024-06-02 RX ORDER — ACETAMINOPHEN 325 MG/1
975 TABLET ORAL EVERY 6 HOURS PRN
Status: DISCONTINUED | OUTPATIENT
Start: 2024-06-02 | End: 2024-06-03 | Stop reason: HOSPADM

## 2024-06-02 RX ORDER — FENTANYL CITRATE 50 UG/ML
INJECTION, SOLUTION INTRAMUSCULAR; INTRAVENOUS AS NEEDED
Status: DISCONTINUED | OUTPATIENT
Start: 2024-06-02 | End: 2024-06-02

## 2024-06-02 RX ORDER — ONDANSETRON 2 MG/ML
4 INJECTION INTRAMUSCULAR; INTRAVENOUS EVERY 6 HOURS PRN
Status: DISCONTINUED | OUTPATIENT
Start: 2024-06-02 | End: 2024-06-03 | Stop reason: HOSPADM

## 2024-06-02 RX ORDER — LIDOCAINE HYDROCHLORIDE 10 MG/ML
INJECTION, SOLUTION EPIDURAL; INFILTRATION; INTRACAUDAL; PERINEURAL AS NEEDED
Status: DISCONTINUED | OUTPATIENT
Start: 2024-06-02 | End: 2024-06-02

## 2024-06-02 RX ORDER — CEFAZOLIN SODIUM 2 G/50ML
2000 SOLUTION INTRAVENOUS ONCE
Status: DISCONTINUED | OUTPATIENT
Start: 2024-06-02 | End: 2024-06-02 | Stop reason: HOSPADM

## 2024-06-02 RX ORDER — MIDAZOLAM HYDROCHLORIDE 2 MG/2ML
INJECTION, SOLUTION INTRAMUSCULAR; INTRAVENOUS AS NEEDED
Status: DISCONTINUED | OUTPATIENT
Start: 2024-06-02 | End: 2024-06-02

## 2024-06-02 RX ORDER — HYDROMORPHONE HCL IN WATER/PF 6 MG/30 ML
0.2 PATIENT CONTROLLED ANALGESIA SYRINGE INTRAVENOUS EVERY 2 HOUR PRN
Status: DISCONTINUED | OUTPATIENT
Start: 2024-06-02 | End: 2024-06-02

## 2024-06-02 RX ORDER — POLYETHYLENE GLYCOL 3350 17 G/17G
17 POWDER, FOR SOLUTION ORAL DAILY PRN
Status: DISCONTINUED | OUTPATIENT
Start: 2024-06-02 | End: 2024-06-03 | Stop reason: HOSPADM

## 2024-06-02 RX ORDER — SODIUM CHLORIDE, SODIUM LACTATE, POTASSIUM CHLORIDE, CALCIUM CHLORIDE 600; 310; 30; 20 MG/100ML; MG/100ML; MG/100ML; MG/100ML
INJECTION, SOLUTION INTRAVENOUS CONTINUOUS PRN
Status: DISCONTINUED | OUTPATIENT
Start: 2024-06-02 | End: 2024-06-02

## 2024-06-02 RX ORDER — SODIUM CHLORIDE 9 MG/ML
100 INJECTION, SOLUTION INTRAVENOUS CONTINUOUS
Status: DISCONTINUED | OUTPATIENT
Start: 2024-06-02 | End: 2024-06-03 | Stop reason: HOSPADM

## 2024-06-02 RX ORDER — GABAPENTIN 300 MG/1
300 CAPSULE ORAL
Status: DISCONTINUED | OUTPATIENT
Start: 2024-06-02 | End: 2024-06-03 | Stop reason: HOSPADM

## 2024-06-02 RX ORDER — TAMSULOSIN HYDROCHLORIDE 0.4 MG/1
0.4 CAPSULE ORAL
Status: DISCONTINUED | OUTPATIENT
Start: 2024-06-02 | End: 2024-06-03 | Stop reason: HOSPADM

## 2024-06-02 RX ORDER — ONDANSETRON 2 MG/ML
INJECTION INTRAMUSCULAR; INTRAVENOUS AS NEEDED
Status: DISCONTINUED | OUTPATIENT
Start: 2024-06-02 | End: 2024-06-02

## 2024-06-02 RX ORDER — HEPARIN SODIUM 5000 [USP'U]/ML
5000 INJECTION, SOLUTION INTRAVENOUS; SUBCUTANEOUS EVERY 8 HOURS SCHEDULED
Status: DISCONTINUED | OUTPATIENT
Start: 2024-06-02 | End: 2024-06-03 | Stop reason: HOSPADM

## 2024-06-02 RX ORDER — PHENAZOPYRIDINE HYDROCHLORIDE 100 MG/1
100 TABLET, FILM COATED ORAL
Status: DISCONTINUED | OUTPATIENT
Start: 2024-06-02 | End: 2024-06-03 | Stop reason: HOSPADM

## 2024-06-02 RX ORDER — EPHEDRINE SULFATE 50 MG/ML
INJECTION INTRAVENOUS AS NEEDED
Status: DISCONTINUED | OUTPATIENT
Start: 2024-06-02 | End: 2024-06-02

## 2024-06-02 RX ORDER — HYDROMORPHONE HCL/PF 1 MG/ML
0.5 SYRINGE (ML) INJECTION EVERY 4 HOURS PRN
Status: DISCONTINUED | OUTPATIENT
Start: 2024-06-02 | End: 2024-06-03 | Stop reason: HOSPADM

## 2024-06-02 RX ORDER — OXYCODONE HYDROCHLORIDE 5 MG/1
5 TABLET ORAL EVERY 4 HOURS PRN
Status: DISCONTINUED | OUTPATIENT
Start: 2024-06-02 | End: 2024-06-03 | Stop reason: HOSPADM

## 2024-06-02 RX ORDER — SENNOSIDES 8.6 MG
1 TABLET ORAL DAILY
Status: DISCONTINUED | OUTPATIENT
Start: 2024-06-02 | End: 2024-06-03 | Stop reason: HOSPADM

## 2024-06-02 RX ORDER — CEFAZOLIN SODIUM 1 G/3ML
INJECTION, POWDER, FOR SOLUTION INTRAMUSCULAR; INTRAVENOUS AS NEEDED
Status: DISCONTINUED | OUTPATIENT
Start: 2024-06-02 | End: 2024-06-02

## 2024-06-02 RX ORDER — PROPOFOL 10 MG/ML
INJECTION, EMULSION INTRAVENOUS AS NEEDED
Status: DISCONTINUED | OUTPATIENT
Start: 2024-06-02 | End: 2024-06-02

## 2024-06-02 RX ORDER — SODIUM CHLORIDE, SODIUM GLUCONATE, SODIUM ACETATE, POTASSIUM CHLORIDE, MAGNESIUM CHLORIDE, SODIUM PHOSPHATE, DIBASIC, AND POTASSIUM PHOSPHATE .53; .5; .37; .037; .03; .012; .00082 G/100ML; G/100ML; G/100ML; G/100ML; G/100ML; G/100ML; G/100ML
125 INJECTION, SOLUTION INTRAVENOUS CONTINUOUS
Status: DISPENSED | OUTPATIENT
Start: 2024-06-02 | End: 2024-06-02

## 2024-06-02 RX ORDER — TADALAFIL 5 MG/1
5 TABLET ORAL DAILY
Status: DISCONTINUED | OUTPATIENT
Start: 2024-06-02 | End: 2024-06-03 | Stop reason: HOSPADM

## 2024-06-02 RX ORDER — GABAPENTIN 100 MG/1
100 CAPSULE ORAL EVERY MORNING
Status: DISCONTINUED | OUTPATIENT
Start: 2024-06-02 | End: 2024-06-03 | Stop reason: HOSPADM

## 2024-06-02 RX ADMIN — HEPARIN SODIUM 5000 UNITS: 5000 INJECTION INTRAVENOUS; SUBCUTANEOUS at 20:53

## 2024-06-02 RX ADMIN — OXYCODONE HYDROCHLORIDE 5 MG: 5 TABLET ORAL at 08:39

## 2024-06-02 RX ADMIN — OXYCODONE HYDROCHLORIDE 5 MG: 5 TABLET ORAL at 02:08

## 2024-06-02 RX ADMIN — GABAPENTIN 100 MG: 100 CAPSULE ORAL at 08:39

## 2024-06-02 RX ADMIN — PHENAZOPYRIDINE HYDROCHLORIDE 100 MG: 100 TABLET ORAL at 20:53

## 2024-06-02 RX ADMIN — SODIUM CHLORIDE, SODIUM LACTATE, POTASSIUM CHLORIDE, AND CALCIUM CHLORIDE: .6; .31; .03; .02 INJECTION, SOLUTION INTRAVENOUS at 11:22

## 2024-06-02 RX ADMIN — PROPOFOL 200 MG: 10 INJECTION, EMULSION INTRAVENOUS at 11:31

## 2024-06-02 RX ADMIN — FENTANYL CITRATE 50 MCG: 50 INJECTION INTRAMUSCULAR; INTRAVENOUS at 11:25

## 2024-06-02 RX ADMIN — FENTANYL CITRATE 50 MCG: 50 INJECTION INTRAMUSCULAR; INTRAVENOUS at 11:56

## 2024-06-02 RX ADMIN — TAMSULOSIN HYDROCHLORIDE 0.4 MG: 0.4 CAPSULE ORAL at 15:36

## 2024-06-02 RX ADMIN — Medication 1000 UNITS: at 08:39

## 2024-06-02 RX ADMIN — GABAPENTIN 300 MG: 300 CAPSULE ORAL at 20:52

## 2024-06-02 RX ADMIN — OXYCODONE HYDROCHLORIDE 5 MG: 5 TABLET ORAL at 20:52

## 2024-06-02 RX ADMIN — GABAPENTIN 300 MG: 300 CAPSULE ORAL at 02:06

## 2024-06-02 RX ADMIN — MIDAZOLAM 2 MG: 1 INJECTION INTRAMUSCULAR; INTRAVENOUS at 11:25

## 2024-06-02 RX ADMIN — HYDROMORPHONE HYDROCHLORIDE 0.2 MG: 0.2 INJECTION, SOLUTION INTRAMUSCULAR; INTRAVENOUS; SUBCUTANEOUS at 18:02

## 2024-06-02 RX ADMIN — EPHEDRINE SULFATE 10 MG: 50 INJECTION INTRAVENOUS at 11:34

## 2024-06-02 RX ADMIN — SENNOSIDES 8.6 MG: 8.6 TABLET, FILM COATED ORAL at 08:39

## 2024-06-02 RX ADMIN — HEPARIN SODIUM 5000 UNITS: 5000 INJECTION INTRAVENOUS; SUBCUTANEOUS at 06:10

## 2024-06-02 RX ADMIN — OXYBUTYNIN CHLORIDE 5 MG: 5 TABLET ORAL at 18:27

## 2024-06-02 RX ADMIN — SODIUM CHLORIDE 100 ML/HR: 0.9 INJECTION, SOLUTION INTRAVENOUS at 18:09

## 2024-06-02 RX ADMIN — ONDANSETRON 4 MG: 2 INJECTION INTRAMUSCULAR; INTRAVENOUS at 12:07

## 2024-06-02 RX ADMIN — FENTANYL CITRATE 50 MCG: 50 INJECTION INTRAMUSCULAR; INTRAVENOUS at 12:43

## 2024-06-02 RX ADMIN — LIDOCAINE HYDROCHLORIDE 50 MG: 10 INJECTION, SOLUTION EPIDURAL; INFILTRATION; INTRACAUDAL; PERINEURAL at 11:31

## 2024-06-02 RX ADMIN — HYDROMORPHONE HYDROCHLORIDE 0.2 MG: 0.2 INJECTION, SOLUTION INTRAMUSCULAR; INTRAVENOUS; SUBCUTANEOUS at 02:50

## 2024-06-02 RX ADMIN — HYDROMORPHONE HYDROCHLORIDE 0.2 MG: 0.2 INJECTION, SOLUTION INTRAMUSCULAR; INTRAVENOUS; SUBCUTANEOUS at 13:19

## 2024-06-02 RX ADMIN — ASPIRIN 81 MG: 81 TABLET, COATED ORAL at 08:39

## 2024-06-02 RX ADMIN — ACETAMINOPHEN 975 MG: 325 TABLET, FILM COATED ORAL at 18:27

## 2024-06-02 RX ADMIN — SODIUM CHLORIDE, SODIUM GLUCONATE, SODIUM ACETATE, POTASSIUM CHLORIDE, MAGNESIUM CHLORIDE, SODIUM PHOSPHATE, DIBASIC, AND POTASSIUM PHOSPHATE 125 ML/HR: .53; .5; .37; .037; .03; .012; .00082 INJECTION, SOLUTION INTRAVENOUS at 03:10

## 2024-06-02 RX ADMIN — CEFAZOLIN 2000 MG: 1 INJECTION, POWDER, FOR SOLUTION INTRAMUSCULAR; INTRAVENOUS at 11:33

## 2024-06-02 NOTE — UTILIZATION REVIEW
Initial Clinical Review    OBSERVATION WRITTEN 6/2/24 @ 0143 CONVERTED TO INPATIENT ADMISSION 6/2/24 @ 1347 DUE TO FURTHER DIAGNOSTIC WORKUP REQUIRED FOR NEPHROLITHIASIS, REQUIRING AT LEAST A 2 MIDNIGHT STAY.    Admission: Date/Time/Statement:   Admission Orders (From admission, onward)       Ordered        06/02/24 1347  INPATIENT ADMISSION  Once            06/02/24 0143  Place in Observation  Once                          Orders Placed This Encounter   Procedures    INPATIENT ADMISSION     Standing Status:   Standing     Number of Occurrences:   1     Order Specific Question:   Level of Care     Answer:   Med Surg [16]     Order Specific Question:   Estimated length of stay     Answer:   More than 2 Midnights     Order Specific Question:   Certification     Answer:   I certify that inpatient services are medically necessary for this patient for a duration of greater than two midnights. See H&P and MD Progress Notes for additional information about the patient's course of treatment.     ED Arrival Information       Expected   -    Arrival   6/1/2024 22:44    Acuity   Urgent              Means of arrival   Walk-In    Escorted by   Family Member    Service   [facility]    Admission type   Emergency              Arrival complaint   Abd pain             Chief Complaint   Patient presents with    Abdominal Pain     Pt has R sided abdominal pain and R sided flank pain. Pt has a stone in R kidney. +N/V       Initial Presentation: 53 y.o. male with PMHx: [facility], history of nephrolithiasis s/p stenting in the past with recurrent UTIs, polycythemia on aspirin and chronic lower back pain/sciatica,, who presented on 6/1 to [facility] ED initially admitted Observation status then converted to Inpatient due to Nephrolithiasis.  Presented due to worsening right lower back pain radiating to the right groin, associated with nausea. Of note, he says he has had urologic  instrumentation in the past, including cystoscopy and an unspecified bladder surgery/procedure.     In the ED, hemodynamically stable on arrival.  Was treated with Toradol, followed by Dilaudid with marked improvement in pain.  Additionally received Zofran and 1L of Isolyte.  Lab work notable for leukocytosis of 12 K with neutrophilia, UA showing 10-20 RBCs per high-powered field and chemistry showing creatinine of 1.53, elevated from baseline of approximately 1.0.  Imaging was not repeated prior to admission.   Plan: med surg, Urology consult, fu on CT renal stone study, keep NPO, start accuchecks, continue pain control prn, give IVF, hold ABX for now, continue home meds. PT OT eval, CM consult. SCDs.      6/2/2024 Inpatient Admission:  Per Urology: 4+ millimeter right proximal ureteral calculus with right-sided hydronephrosis: As the patient has intractable flank pain secondary to the stone without signs of fever or infection, recommend cystoscopy with right ureteroscopy, holmium laser lithotripsy, right retrograde pyelography and right ureteral stent insertion. Continue above tx plan including NPO and IVF, pain control, monitor VS and labs, continue home meds.     6/2 Per OP Note:  Procedure(s):  Right - CYSTOSCOPY URETEROSCOPY WITH LITHOTRIPSY HOLMIUM LASER. RETROGRADE PYELOGRAM AND INSERTION STENT URETERAL  Drains: Right 24-6 Hungarian double-J ureteral stent with string  Anesthesia Type: General  Operative Findings:  Right proximal ureteral calculus pushed back into the kidney.  The stone was then decimated with a 272 µm holmium laser fiber until all fragments were small enough to be passable.    ED Triage Vitals   Temperature Pulse Respirations Blood Pressure SpO2   06/01/24 2248 06/01/24 2248 06/01/24 2248 06/01/24 2248 06/01/24 2248   99 °F (37.2 °C) 60 18 142/98 100 %      Temp Source Heart Rate Source Patient Position - Orthostatic VS BP Location FiO2 (%)   06/01/24 2248 06/01/24 2248 06/02/24 0237 06/01/24  2248 --   Tympanic Monitor Lying Left arm       Pain Score       06/01/24 2248       10 - Worst Possible Pain          Wt Readings from Last 1 Encounters:   05/30/24 88.5 kg (195 lb)     Additional Vital Signs:   Date/Time Temp Pulse Resp BP MAP (mmHg) SpO2 Calculated FIO2 (%) - Nasal Cannula Nasal Cannula O2 Flow Rate (L/min) O2 Device Cardiac (WDL) Patient Position - Orthostatic VS   06/02/24 1300 -- 72 18 114/73 83 94 % -- -- -- WDL --   06/02/24 1245 -- 78 13 119/67 82 95 % -- -- None (Room air) WDL --   06/02/24 1230 -- 80 14 109/70 88 95 % -- -- -- -- --   06/02/24 1220 97.8 °F (36.6 °C) 92 18 118/67 88 96 % -- -- None (Room air) WDL --   06/02/24 0809 97.8 °F (36.6 °C) -- -- 109/56 74 -- -- -- -- -- --   06/02/24 0250 -- -- -- -- -- 98 % -- -- None (Room air) -- --   06/02/24 02:37:03 98.4 °F (36.9 °C) 66 -- 129/74 92 98 % -- -- -- -- Lying   06/02/24 0100 -- 68 -- 123/63 87 97 % -- -- -- -- --   06/02/24 0000 -- 70 -- 119/57 82 97 % -- -- -- -- --   06/01/24 2332 -- -- -- -- -- 98 % 28 2 L/min Nasal cannula -- --   06/01/24 2323 -- -- -- -- -- 88 % Abnormal  -- -- None (Room air) -- --   06/01/24 2248 99 °F (37.2 °C) 60 18 142/98 -- 100 % -- -- None (Room air) -- --       Pertinent Labs/Diagnostic Test Results:   6/1 EKG: NSR    CT renal stone study abdomen pelvis wo contrast   Final Result by Jean Joshua MD (06/02 0813)      Interval development of mild right-sided hydroureteronephrosis secondary to a 4 mm calculus in the proximal right ureter. Right-sided perinephric and periureteral stranding.               Workstation performed: [id number]         FL retrograde pyelogram    (Results Pending)         Results from last 7 days   Lab Units 06/02/24  0428 06/01/24  2307   WBC Thousand/uL 8.53 12.27*   HEMOGLOBIN g/dL 15.7 16.8   HEMATOCRIT % 45.3 47.5   PLATELETS Thousands/uL 178 197   TOTAL NEUT ABS Thousands/µL 6.70 8.71*         Results from last 7 days   Lab Units 06/02/24  0428 06/01/24 2307    SODIUM mmol/L 136 140   POTASSIUM mmol/L 4.1 3.8   CHLORIDE mmol/L 104 104   CO2 mmol/L 26 25   ANION GAP mmol/L 6 11   BUN mg/dL 26* 25   CREATININE mg/dL 1.72* 1.53*   EGFR ml/min/1.73sq m 44 50   CALCIUM mg/dL 8.3* 9.1     Results from last 7 days   Lab Units 06/01/24  2307   AST U/L 25   ALT U/L 34   ALK PHOS U/L 53   TOTAL PROTEIN g/dL 6.8   ALBUMIN g/dL 4.5   TOTAL BILIRUBIN mg/dL 0.77         Results from last 7 days   Lab Units 06/02/24  0428 06/01/24  2307   GLUCOSE RANDOM mg/dL 123 157*     Results from last 7 days   Lab Units 06/01/24  2312 06/01/24  2310 05/30/24  1232   CLARITY UA   --  Clear clear   COLOR UA  Yellow Yellow yellow   SPEC GRAV UA   --  1.025  --    PH UA   --  6.5  --    GLUCOSE UA mg/dl  --  Negative negative   KETONES UA mg/dl  --  Negative negative   BLOOD UA   --  Moderate* large   PROTEIN UA mg/dl  --  Negative negative   NITRITE UA   --  Negative negative   BILIRUBIN UA   --  Negative  --    BILIRUBIN UA POC   --   --  negative   UROBILINOGEN UA E.U./dl  --  0.2 0.2   LEUKOCYTES UA   --  Negative negative   WBC UA /hpf  --  None Seen  --    RBC UA /hpf  --  10-20*  --    BACTERIA UA /hpf  --  None Seen  --    EPITHELIAL CELLS WET PREP /hpf  --  None Seen  --        ED Treatment:   Medication Administration from 06/01/2024 2244 to 06/02/2024 0230         Date/Time Order Dose Route Action     06/01/2024 2315 EDT multi-electrolyte (ISOLYTE-S PH 7.4) bolus 1,000 mL 1,000 mL Intravenous New Bag     06/01/2024 2306 EDT ondansetron (ZOFRAN) injection 4 mg 4 mg Intravenous Given     06/01/2024 2304 EDT HYDROmorphone (DILAUDID) injection 0.5 mg 0.5 mg Intravenous Given     06/01/2024 2306 EDT ketorolac (TORADOL) injection 15 mg 15 mg Intravenous Given     06/02/2024 0208 EDT oxyCODONE (ROXICODONE) split tablet 2.5 mg -- Oral See Alternative     06/02/2024 0208 EDT oxyCODONE (ROXICODONE) IR tablet 5 mg 5 mg Oral Given     06/02/2024 0206 EDT gabapentin (NEURONTIN) capsule 300 mg 300 mg  Oral Given          History reviewed. No pertinent past medical history.  Present on Admission:   Nephrolithiasis   GLADYS (acute kidney injury) (HCC)   Polycythemia      Admitting Diagnosis: Nephrolithiasis [N20.0]  Abdominal pain [R10.9]  GLADYS (acute kidney injury) (HCC) [N17.9]  Age/Sex: 53 y.o. male  Admission Orders:  Scheduled Medications:  aspirin, 81 mg, Oral, Daily  cholecalciferol, 1,000 Units, Oral, Daily  gabapentin, 300 mg, Oral, HS   And  gabapentin, 100 mg, Oral, QAM  heparin (porcine), 5,000 Units, Subcutaneous, Q8H CHINO  senna, 1 tablet, Oral, Daily  tadalafil, 5 mg, Oral, Daily  tamsulosin, 0.4 mg, Oral, Daily With Dinner      Continuous IV Infusions:   multi-electrolyte (PLASMALYTE-A/ISOLYTE-S PH 7.4) IV solution  Rate: 125 mL/hr Dose: 125 mL/hr  Freq: Continuous Route: IV  Indications of Use: IV Hydration  Last Dose: Stopped (06/02/24 1348)  Start: 06/02/24 0300 End: 06/02/24 1301       PRN Meds:  acetaminophen, 975 mg, Oral, Q6H PRN  HYDROmorphone, 0.2 mg, Intravenous, Q2H PRN x2  lactated ringers, 1,000 mL, Intravenous, Once PRN   And  lactated ringers, 1,000 mL, Intravenous, Once PRN  naloxone, 0.04 mg, Intravenous, Q1MIN PRN  ondansetron, 4 mg, Intravenous, Q6H PRN  oxyCODONE, 2.5 mg, Oral, Q4H PRN   Or  oxyCODONE, 5 mg, Oral, Q4H PRN x2  polyethylene glycol, 17 g, Oral, Daily PRN  sodium chloride, 1,000 mL, Intravenous, Once PRN   And  sodium chloride, 1,000 mL, Intravenous, Once PRN        IP CONSULT TO UROLOGY  IP CONSULT TO CASE MANAGEMENT    Network Utilization Review Department  ATTENTION: Please call with any questions or concerns to [phone] and carefully listen to the prompts so that you are directed to the right person. All voicemails are confidential.   For Discharge needs, contact Care Management DC Support Team at [phone] opt. [phone]  Send all requests for admission clinical reviews, approved or denied determinations and any other requests to dedicated fax number below belonging  to the campus where the patient is receiving treatment. List of dedicated fax numbers for the Facilities:  FACILITY NAME UR FAX NUMBER   ADMISSION DENIALS (Administrative/Medical Necessity) [phone]   DISCHARGE SUPPORT TEAM (NETWORK) [phone]   PARENT CHILD HEALTH (Maternity/NICU/Pediatrics) [phone]   [facility] [phone]   [facility] [phone]   [facility] [phone]   [facility] [phone]   [facility] [phone]   [facility] [phone]   [facility] [phone]   [facility] [phone]   [facility] [phone]   [facility] [phone]   [facility] [phone]   [facility] [phone]

## 2024-06-02 NOTE — ED ATTENDING ATTESTATION
"I, Terrence Yang MD, saw and evaluated the patient. I have discussed the patient with the resident and agree with the resident's findings, Plan of Care, and MDM as documented in the resident's note, except where noted. All available labs and Radiology studies were reviewed.  I was present for key portions of any procedure(s) performed by the resident and I was immediately available to provide assistance.    At this point I agree with the current assessment done in the Emergency Department.  I have conducted an independent evaluation of this patient a history and physical is as follows:    54 yo male with a history of nephrolithiasis presents to the ED complaining of right sided flank pain. The patient reports a severe \"sharp\" pain in the right flank that radiates into the right groin. (+) Nausea and several episodes of NBNB vomiting. Pain started around 1900 tonight. He says these symptoms are consistent with past kidney stones. No fevers or chills. He denies testicle pain/swelling. No dysuria or hematuria. No chest pain, shortness of breath, or diaphoresis. No other specific complaints.    ROS: per resident physician note    Gen: uncomfortable appearing, AA&Ox3  HEENT: PERRL, EOMI  Neck: supple  CV: RRR  Lungs: CTA B/L  Abdomen: soft, (+) mild suprapubic tenderness  Back: (+) right CVA tenderness  Ext: no swelling or deformity  Neuro: 5/5 strength all extremities, sensation grossly intact  Skin: no rash    ED Course  The patient is uncomfortable appearing with right sided CVA tenderness on exam. Vital signs are stable. Presentation is most consistent with nephrolithiasis. Lower clinical suspicion for pyelonephritis, musculoskeletal pain, appendicitis, pancreatitis, and gallbladder disease. Will check UA and basic labs. IVFs, Toradol, Zofran, and Dilaudid administered. Will continue to monitor in the ED. Disposition per workup and reassessment.      Critical Care Time  Procedures   "

## 2024-06-02 NOTE — ASSESSMENT & PLAN NOTE
Patient presenting with abdominal pain and right-sided flank pain with history of nephrolithiasis.  Last CT imaging from 4/28/2024 over 1-month ago which was positive for left-sided nephrolithiasis.  Now presenting with pain on opposite side.    -Discontinued order for kidney and ultrasound PVR, ordered CT renal stone study for further evaluation of  track and for surgical planning.  -Continue with IV fluids  -Flomax  -Antiemetics  -Analgesics  -Keep n.p.o. for now pending CT imaging and OR schedule availability.  Timing will be ultimately up to urology service.

## 2024-06-02 NOTE — ANESTHESIA PREPROCEDURE EVALUATION
Procedure:  CYSTOSCOPY URETEROSCOPY WITH LITHOTRIPSY HOLMIUM LASER, RETROGRADE PYELOGRAM AND INSERTION STENT URETERAL (Right: Bladder)    Relevant Problems   /RENAL   (+) GLADYS (acute kidney injury) (HCC)   (+) Nephrolithiasis      Bmi 30.5  Physical Exam    Airway    Mallampati score: I  TM Distance: >3 FB  Neck ROM: full     Dental        Cardiovascular      Pulmonary      Other Findings        Anesthesia Plan  ASA Score- 2     Anesthesia Type- general with ASA Monitors.         Additional Monitors:     Airway Plan:     Comment:  ID [id number].       Plan Factors-Exercise tolerance (METS): >4 METS.    Chart reviewed.    Patient summary reviewed.                  Induction- intravenous.    Postoperative Plan-     Perioperative Resuscitation Plan - Level 1 - Full Code.       Informed Consent- Anesthetic plan and risks discussed with patient.  I personally reviewed this patient with the CRNA. Discussed and agreed on the Anesthesia Plan with the CRNA..

## 2024-06-02 NOTE — H&P
"      INTERNAL MEDICINE RESIDENCY ADMISSION H&P     Name: Hiral Cullen   Age & Sex: 53 y.o. male   MRN: [id number]  Unit/Bed#: CW2 212-01   Encounter: [id number]  Primary Care Provider: Kris Shell MD    Code Status: Level 1 - Full Code  Admission Status: OBSERVATION  Disposition: Patient requires Med/Surg    Admit to team: SOD Team A    ASSESSMENT/PLAN     Principal Problem:    Nephrolithiasis  Active Problems:    GLADYS (acute kidney injury) (HCC)    Polycythemia      Polycythemia  Assessment & Plan  Patient has history of polycythemia of unclear etiology.  Per chart review, patient was previously living in [facility] and had been told he has \"thick blood\", had been taking 1 baby aspirin daily.  Was partially attributed to him living [place] higher [place]approximately [place]), and hemoglobin has trended downward slightly over the last several months since moving here.    Unknown if he has any family history of polycythemia vera or any hematologic malignancies.    Plan:  Pending hemochromatosis workup as outpatient, can also consider investigating for JAK2 mutation if pursuing genetic workup  Continue aspirin 81 mg daily    GLADYS (acute kidney injury) (HCC)  Assessment & Plan  Patient presenting with acute kidney injury, evidenced by increase in serum creatinine > 0.3 mg/dL within 48 hours and increase in serum creatinine > 1.5 times baseline within the last 7 days, with baseline creatinine ~1.0.    Suspect etiology is postrenal with obstructive nephropathy given history of benign prostatic hyperplasia with superimposed urinary tract obstruction.    Plan:  avoid nephrotoxic medications  optimize BP to prevent further progression of kidney disease  monitor BMP  monitor electrolytes  monitor intake and urine output  Urology consultation    * Nephrolithiasis  Assessment & Plan  Patient has a history of nephrolithiasis s/p stenting in the past with recurrent UTIs, had passed a kidney stone approximately 1 to 2 " months ago and recently found to have microscopic hematuria again - CT from 4/28/2024 was notable for an obstructing left 6 mm UVJ stone, and a nonobstructing right renal lower pole 5 mm stone.  Patient notes he has had urologic instrumentation in the past, with cystoscopy and an unspecified urologic surgery involving his bladder.    In the ED, afebrile and normotensive, not tachycardic.  On exam, tender over the right flank with suprapubic scar from prior surgery.  Chemistry notable for creatinine of 1.53, consistent with GLADYS.  CBC notable for leukocytosis of 12,000, with urinalysis showing moderate blood; no WBCs or bacteria.  No prior urine culture data available for review.    CT Abdomen/Pelvis w/ contrast 4/28/24  Left UVJ 6 mm calculus with associated mild hydroureter  Urothelial enhancement of the left distal ureter, possibly secondary to recent stone passage vs ascending infection  Nonobstructing right renal lower pole 5 mm calculus    Plan:  Urology consult  Follow-up on CT renal stone study  N.p.o. for possible intervention  Continue home tadalafil and tamsulosin  Tylenol, oxycodone 2.5/5 and breakthrough Dilaudid ordered for pain control  Zofran as needed for nausea  IV fluid hydration with isolyte 125 cc/h  Holding off on antibiotics, low suspicion for sepsis and pyelonephritis, but low threshold to initiate should he clinically worsen        VTE Pharmacologic Prophylaxis: Heparin  VTE Mechanical Prophylaxis: sequential compression device    CHIEF COMPLAINT     Chief Complaint   Patient presents with    Abdominal Pain     Pt has R sided abdominal pain and R sided flank pain. Pt has a stone in R kidney. +N/V      HISTORY OF PRESENT ILLNESS     Patient is a 53-year-old male who recently moved from [facility] within the last year, history of nephrolithiasis s/p stenting in the past with recurrent UTIs, polycythemia on aspirin and chronic lower back pain/sciatica, presents to the ED tonight for progressively  worsening right lower back pain radiating to the right groin since 6 to 7 PM, associated with nausea.  Patient denies any fever/chills, vomiting, diarrhea, chest pain, shortness of breath, urinary symptoms.  Of note, he says he has had urologic instrumentation in the past, including cystoscopy and an unspecified bladder surgery/procedure.    In the ED, hemodynamically stable on arrival.  Was treated with Toradol, followed by Dilaudid with marked improvement in pain.  Additionally received Zofran and 1L of Isolyte.  Lab work notable for leukocytosis of 12 K with neutrophilia, UA showing 10-20 RBCs per high-powered field and chemistry showing creatinine of 1.53, elevated from baseline of approximately 1.0.  Imaging was not repeated prior to admission.    Patient placed in observation under SOD A for further pain control, IV fluid hydration, monitoring and urologic consultation.    REVIEW OF SYSTEMS     Constitutional: Negative for fever.   HENT: Negative for sore throat.    Eyes: Negative for pain.   Respiratory: Negative for cough.    Cardiovascular: Negative for chest pain.   Gastrointestinal: As above in HPI.   Endocrine: Negative.    Genitourinary: As above in HPI.   Musculoskeletal: Negative for arthralgias.   Skin: Negative for rash.   Allergic/Immunologic: Negative.    Neurological: Negative for weakness.   Hematological: Negative.    Psychiatric/Behavioral: Negative for hallucinations.     OBJECTIVE     Vitals:    24 2332 24 0000 24 0100 24 0237   BP:  119/57 123/63 129/74   BP Location:    Right arm   Pulse:  70 68 66   Resp:       Temp:    98.4 °F (36.9 °C)   TempSrc:    Oral   SpO2: 98% 97% 97% 98%      Temperature:   Temp (24hrs), Av.7 °F (37.1 °C), Min:98.4 °F (36.9 °C), Max:99 °F (37.2 °C)    Temperature: 98.4 °F (36.9 °C)  Intake & Output:  I/O       None          Weights:        There is no height or weight on file to calculate BMI.  Weight (last 2 days)       None           Physical Exam  Vitals reviewed.   Constitutional:       Appearance: He is not toxic-appearing.      Comments: Patient appears moderately uncomfortable secondary to pain.   HENT:      Head: Normocephalic and atraumatic.      Right Ear: External ear normal.      Left Ear: External ear normal.      Nose: Nose normal.      Mouth/Throat:      Mouth: Mucous membranes are moist.      Pharynx: Oropharynx is clear.   Eyes:      Extraocular Movements: Extraocular movements intact.      Pupils: Pupils are equal, round, and reactive to light.   Cardiovascular:      Rate and Rhythm: Normal rate and regular rhythm.      Pulses: Normal pulses.      Heart sounds: Normal heart sounds.   Pulmonary:      Effort: Pulmonary effort is normal. No respiratory distress.      Breath sounds: Normal breath sounds.   Abdominal:      General: There is no distension.      Palpations: Abdomen is soft.      Comments: Generalized tenderness over the right flank.  Scarring noted over suprapubic area, from prior surgery.   Musculoskeletal:      Right lower leg: No edema.      Left lower leg: No edema.   Skin:     General: Skin is warm and dry.   Neurological:      General: No focal deficit present.      Mental Status: He is alert and oriented to person, place, and time. Mental status is at baseline.   Psychiatric:         Mood and Affect: Mood normal.       PAST MEDICAL HISTORY   History reviewed. No pertinent past medical history.  PAST SURGICAL HISTORY     Past Surgical History:   Procedure Laterality Date    BLADDER STONE REMOVAL       SOCIAL & FAMILY HISTORY     Social History     Substance and Sexual Activity   Alcohol Use Not Currently       Social History     Substance and Sexual Activity   Drug Use Never     Social History     Tobacco Use   Smoking Status Never   Smokeless Tobacco Never     History reviewed. No pertinent family history.  LABORATORY DATA     Labs: I have personally reviewed pertinent reports.    Results from last 7 days  "  Lab Units 06/01/24  2307   WBC Thousand/uL 12.27*   HEMOGLOBIN g/dL 16.8   HEMATOCRIT % 47.5   PLATELETS Thousands/uL 197   SEGS PCT % 71   MONO PCT % 7   EOS PCT % 2      Results from last 7 days   Lab Units 06/01/24  2307   POTASSIUM mmol/L 3.8   CHLORIDE mmol/L 104   CO2 mmol/L 25   BUN mg/dL 25   CREATININE mg/dL 1.53*   CALCIUM mg/dL 9.1   ALK PHOS U/L 53   ALT U/L 34   AST U/L 25                          Micro:  No results found for: \"BLOODCX\", \"URINECX\", \"WOUNDCULT\", \"SPUTUMCULTUR\"  IMAGING & DIAGNOSTIC TESTS     Imaging: I have personally reviewed pertinent reports.    No results found.  EKG, Pathology, and Other Studies: I have personally reviewed pertinent reports.     ALLERGIES   No Known Allergies  MEDICATIONS PRIOR TO ARRIVAL     Prior to Admission medications    Medication Sig Start Date End Date Taking? Authorizing Provider   aspirin (ECOTRIN LOW STRENGTH) 81 mg EC tablet Take 81 mg by mouth daily    Historical Provider, MD   celecoxib (CeleBREX) 200 mg capsule Take 1 capsule (200 mg total) by mouth 2 (two) times a day for 14 days 5/5/24 5/19/24  Kris Shell MD   cholecalciferol (VITAMIN D3) 1,000 units tablet Take 1 tablet (1,000 Units total) by mouth daily 3/28/24 7/26/24  Kris Shell MD   Diclofenac Sodium (VOLTAREN) 1 % Apply 2 g topically 4 (four) times a day For lower back pain. 11/22/23   Tommy Castrejon MD   gabapentin (Neurontin) 100 mg capsule Take 3 capsules (300 mg total) by mouth daily at bedtime AND 1 capsule (100 mg total) every morning. 5/30/24 9/27/24  Kris Shell MD   oxyCODONE (Roxicodone) 5 immediate release tablet Take 1 tablet (5 mg total) by mouth every 6 (six) hours as needed for moderate pain for up to 20 doses Max Daily Amount: 20 mg 4/28/24   Lucas Crook MD   tadalafil (CIALIS) 5 MG tablet Take 1 tablet (5 mg total) by mouth in the morning 5/30/24 6/29/24  Kris Shell MD   tamsulosin (FLOMAX) 0.4 mg Take 1 capsule (0.4 mg total) by mouth daily with dinner " 5/30/24   Kris Shell MD     MEDICATIONS ADMINISTERED IN LAST 24 HOURS     Medication Administration - last 24 hours from 06/01/2024 0404 to 06/02/2024 0404         Date/Time Order Dose Route Action Action by     06/02/2024 0159 EDT multi-electrolyte (ISOLYTE-S PH 7.4) bolus 1,000 mL 0 mL Intravenous Stopped Flakito An RN     06/01/2024 2315 EDT multi-electrolyte (ISOLYTE-S PH 7.4) bolus 1,000 mL 1,000 mL Intravenous New Bag Flakito An RN     06/01/2024 2306 EDT ondansetron (ZOFRAN) injection 4 mg 4 mg Intravenous Given Flakito An RN     06/01/2024 2304 EDT HYDROmorphone (DILAUDID) injection 0.5 mg 0.5 mg Intravenous Given Flakito An RN     06/01/2024 2306 EDT ketorolac (TORADOL) injection 15 mg 15 mg Intravenous Given Flakito An RN     06/02/2024 0244 EDT oxyCODONE (ROXICODONE) split tablet 2.5 mg -- Oral See Alternative Heike Pacheco RN     06/02/2024 0208 EDT oxyCODONE (ROXICODONE) split tablet 2.5 mg -- Oral See Alternative Flakito An RN     06/02/2024 0244 EDT oxyCODONE (ROXICODONE) IR tablet 5 mg 5 mg Oral Not Given Heike Pacheco RN     06/02/2024 0208 EDT oxyCODONE (ROXICODONE) IR tablet 5 mg 5 mg Oral Given Flakito An RN     06/02/2024 0250 EDT HYDROmorphone HCl (DILAUDID) injection 0.2 mg 0.2 mg Intravenous Given Heike Pacheco RN     06/02/2024 0206 EDT gabapentin (NEURONTIN) capsule 300 mg 300 mg Oral Given Flakito An RN     06/02/2024 0310 EDT multi-electrolyte (PLASMALYTE-A/ISOLYTE-S PH 7.4) IV solution 125 mL/hr Intravenous New Bag Heike Pacheco RN          CURRENT MEDICATIONS     Current Facility-Administered Medications   Medication Dose Route Frequency Provider Last Rate    acetaminophen  975 mg Oral Q6H PRN Augusto Campo MD      aspirin  81 mg Oral Daily Augusto Campo MD      cholecalciferol  1,000 Units Oral Daily Augusto Campo MD      gabapentin  300 mg Oral HS Augusto Campo MD      And     "gabapentin  100 mg Oral QAM Augusto Campo MD      heparin (porcine)  5,000 Units Subcutaneous Q8H CHINO Augusto Campo MD      HYDROmorphone  0.2 mg Intravenous Q2H PRN Augusto Campo MD      multi-electrolyte  125 mL/hr Intravenous Continuous Amada Soyeon Kim,  mL/hr (06/02/24 0310)    naloxone  0.04 mg Intravenous Q1MIN PRN Augusto Campo MD      ondansetron  4 mg Intravenous Q6H PRN Augusto Campo MD      oxyCODONE  2.5 mg Oral Q4H PRN Augusto Campo MD      Or    oxyCODONE  5 mg Oral Q4H PRN Augusto Campo MD      polyethylene glycol  17 g Oral Daily PRN Augusto Campo MD      senna  1 tablet Oral Daily Augusto Campo MD      tadalafil  5 mg Oral Daily Augusto Campo MD      tamsulosin  0.4 mg Oral Daily With Dinner Augusto Campo MD       multiKevinelectrolyte, 125 mL/hr, Last Rate: 125 mL/hr (06/02/24 0310)      acetaminophen, 975 mg, Q6H PRN  HYDROmorphone, 0.2 mg, Q2H PRN  naloxone, 0.04 mg, Q1MIN PRN  ondansetron, 4 mg, Q6H PRN  oxyCODONE, 2.5 mg, Q4H PRN   Or  oxyCODONE, 5 mg, Q4H PRN  polyethylene glycol, 17 g, Daily PRN        Admission Time  I spent 30 minutes admitting the patient.  This involved direct patient contact where I performed a full history and physical, reviewing previous records, and reviewing laboratory and other diagnostic studies.    Portions of the record may have been created with voice recognition software.  Occasional wrong word or \"sound a like\" substitutions may have occurred due to the inherent limitations of voice recognition software.  Read the chart carefully and recognize, using context, where substitutions have occurred.    ==  Augusto Campo MD  [facility]  Internal Medicine Residency PGY-1  "

## 2024-06-02 NOTE — PLAN OF CARE
Problem: PAIN - ADULT  Goal: Verbalizes/displays adequate comfort level or baseline comfort level  Description: Interventions:  - Encourage patient to monitor pain and request assistance  - Assess pain using appropriate pain scale  - Administer analgesics based on type and severity of pain and evaluate response  - Implement non-pharmacological measures as appropriate and evaluate response  - Consider cultural and social influences on pain and pain management  - Notify physician/advanced practitioner if interventions unsuccessful or patient reports new pain  Outcome: Progressing     Problem: INFECTION - ADULT  Goal: Absence or prevention of progression during hospitalization  Description: INTERVENTIONS:  - Assess and monitor for signs and symptoms of infection  - Monitor lab/diagnostic results  - Monitor all insertion sites, i.e. indwelling lines, tubes, and drains  - Monitor endotracheal if appropriate and nasal secretions for changes in amount and color  - [place] appropriate cooling/warming therapies per order  - Administer medications as ordered  - Instruct and encourage patient and family to use good hand hygiene technique  - Identify and instruct in appropriate isolation precautions for identified infection/condition  Outcome: Progressing

## 2024-06-02 NOTE — ANESTHESIA POSTPROCEDURE EVALUATION
Post-Op Assessment Note    CV Status:  Stable  Pain Score: 0    Pain management: adequate       Mental Status:  Sleepy and arousable   Hydration Status:  Stable   PONV Controlled:  None   Airway Patency:  Patent     Post Op Vitals Reviewed: Yes    No anethesia notable event occurred.    Staff: CRNA               BP   118/67   Temp 97.8   Pulse 81   Resp 12   SpO2 96

## 2024-06-02 NOTE — ED PROVIDER NOTES
History  Chief Complaint   Patient presents with    Abdominal Pain     Pt has R sided abdominal pain and R sided flank pain. Pt has a stone in R kidney. +N/V     The patient is a 53 y.o. male with a history of nephrolithiasis who presents to the Emergency Department with a chief complaint of R sided flank pain, nausea and vomiting. Symptoms began abruptly at 7pm this evening and have been persist since onset. His  pain is currently rated as a 10/10 in severity and described as sharp with radiation to the R groin. No associated fevers or chills. Symptoms are aggravated with movement and there are no alleviating factors. The patient states he had similar symptoms one month ago when he was dx with a L kidney stone which he passed. He was noted to have a second stone on the R. He has urology follow-up scheduled for 6/11.           Prior to Admission Medications   Prescriptions Last Dose Informant Patient Reported? Taking?   Diclofenac Sodium (VOLTAREN) 1 %   No No   Sig: Apply 2 g topically 4 (four) times a day For lower back pain.   aspirin (ECOTRIN LOW STRENGTH) 81 mg EC tablet   Yes No   Sig: Take 81 mg by mouth daily   celecoxib (CeleBREX) 200 mg capsule   No No   Sig: Take 1 capsule (200 mg total) by mouth 2 (two) times a day for 14 days   cholecalciferol (VITAMIN D3) 1,000 units tablet   No No   Sig: Take 1 tablet (1,000 Units total) by mouth daily   gabapentin (Neurontin) 100 mg capsule   No No   Sig: Take 3 capsules (300 mg total) by mouth daily at bedtime AND 1 capsule (100 mg total) every morning.   oxyCODONE (Roxicodone) 5 immediate release tablet   No No   Sig: Take 1 tablet (5 mg total) by mouth every 6 (six) hours as needed for moderate pain for up to 20 doses Max Daily Amount: 20 mg   tadalafil (CIALIS) 5 MG tablet   No No   Sig: Take 1 tablet (5 mg total) by mouth in the morning   tamsulosin (FLOMAX) 0.4 mg   No No   Sig: Take 1 capsule (0.4 mg total) by mouth daily with dinner      Facility-Administered  Medications: None       History reviewed. No pertinent past medical history.    Past Surgical History:   Procedure Laterality Date    BLADDER STONE REMOVAL         History reviewed. No pertinent family history.  I have reviewed and agree with the history as documented.    E-Cigarette/Vaping    E-Cigarette Use Never User      E-Cigarette/Vaping Substances    Nicotine No     THC No     CBD No     Flavoring No     Other No     Unknown No      Social History     Tobacco Use    Smoking status: Never    Smokeless tobacco: Never   Vaping Use    Vaping status: Never Used   Substance Use Topics    Alcohol use: Not Currently    Drug use: Never        Review of Systems   Constitutional:  Negative for chills and fever.   HENT:  Negative for ear pain and sore throat.    Eyes:  Negative for pain and visual disturbance.   Respiratory:  Negative for cough and shortness of breath.    Cardiovascular:  Negative for chest pain and palpitations.   Gastrointestinal:  Positive for abdominal pain, nausea and vomiting.   Genitourinary:  Positive for flank pain and urgency. Negative for dysuria and hematuria.   Musculoskeletal:  Negative for arthralgias and back pain.   Skin:  Negative for color change and rash.   Neurological:  Negative for seizures and syncope.   All other systems reviewed and are negative.      Physical Exam  ED Triage Vitals   Temperature Pulse Respirations Blood Pressure SpO2   06/01/24 2248 06/01/24 2248 06/01/24 2248 06/01/24 2248 06/01/24 2248   99 °F (37.2 °C) 60 18 142/98 100 %      Temp Source Heart Rate Source Patient Position - Orthostatic VS BP Location FiO2 (%)   06/01/24 2248 06/01/24 2248 06/02/24 0237 06/01/24 2248 --   Tympanic Monitor Lying Left arm       Pain Score       06/01/24 2248       10 - Worst Possible Pain             Orthostatic Vital Signs  Vitals:    06/02/24 0000 06/02/24 0100 06/02/24 0237 06/02/24 0809   BP: 119/57 123/63 129/74 109/56   Pulse: 70 68 66    Patient Position - Orthostatic  VS:   Lying        Physical Exam  Vitals and nursing note reviewed.   Constitutional:       General: He is in acute distress (secondary to pain).      Appearance: He is well-developed.   HENT:      Head: Normocephalic and atraumatic.   Eyes:      Conjunctiva/sclera: Conjunctivae normal.   Cardiovascular:      Rate and Rhythm: Normal rate and regular rhythm.      Heart sounds: No murmur heard.  Pulmonary:      Effort: Pulmonary effort is normal. No respiratory distress.      Breath sounds: Normal breath sounds.   Abdominal:      Palpations: Abdomen is soft.      Tenderness: There is abdominal tenderness in the suprapubic area. There is right CVA tenderness.   Musculoskeletal:         General: No swelling.      Cervical back: Neck supple.   Skin:     General: Skin is warm and dry.      Capillary Refill: Capillary refill takes less than 2 seconds.   Neurological:      Mental Status: He is alert.   Psychiatric:         Mood and Affect: Mood normal.         ED Medications  Medications   ondansetron (ZOFRAN) injection 4 mg (has no administration in time range)   senna (SENOKOT) tablet 8.6 mg (8.6 mg Oral Given 6/2/24 0839)   polyethylene glycol (MIRALAX) packet 17 g (has no administration in time range)   acetaminophen (TYLENOL) tablet 975 mg (has no administration in time range)   oxyCODONE (ROXICODONE) split tablet 2.5 mg ( Oral See Alternative 6/2/24 0839)     Or   oxyCODONE (ROXICODONE) IR tablet 5 mg (5 mg Oral Given 6/2/24 0839)   HYDROmorphone HCl (DILAUDID) injection 0.2 mg (0.2 mg Intravenous Given 6/2/24 0250)   naloxone (NARCAN) 0.04 mg/mL syringe 0.04 mg (has no administration in time range)   Cholecalciferol (VITAMIN D3) tablet 1,000 Units (1,000 Units Oral Given 6/2/24 0839)   aspirin (ECOTRIN LOW STRENGTH) EC tablet 81 mg (81 mg Oral Given 6/2/24 0839)   gabapentin (NEURONTIN) capsule 300 mg (300 mg Oral Given 6/2/24 0206)     And   gabapentin (NEURONTIN) capsule 100 mg (100 mg Oral Given 6/2/24 0839)    tamsulosin (FLOMAX) capsule 0.4 mg (has no administration in time range)   tadalafil (CIALIS) tablet 5 mg (5 mg Oral Not Given 6/2/24 0843)   multi-electrolyte (PLASMALYTE-A/ISOLYTE-S PH 7.4) IV solution (125 mL/hr Intravenous New Bag 6/2/24 0310)   heparin (porcine) subcutaneous injection 5,000 Units ( Subcutaneous Dose Auto Held 6/5/24 2200)   ceFAZolin (ANCEF) IVPB (premix in dextrose) 2,000 mg 50 mL (has no administration in time range)   multi-electrolyte (ISOLYTE-S PH 7.4) bolus 1,000 mL (0 mL Intravenous Stopped 6/2/24 0159)   ondansetron (ZOFRAN) injection 4 mg (4 mg Intravenous Given 6/1/24 2306)   HYDROmorphone (DILAUDID) injection 0.5 mg (0.5 mg Intravenous Given 6/1/24 2304)   ketorolac (TORADOL) injection 15 mg (15 mg Intravenous Given 6/1/24 2306)       Diagnostic Studies  Results Reviewed       Procedure Component Value Units Date/Time    Basic metabolic panel [[id number]]  (Abnormal) Collected: 06/02/24 0428    Lab Status: Final result Specimen: Blood from Arm, Right Updated: 06/02/24 0516     Sodium 136 mmol/L      Potassium 4.1 mmol/L      Chloride 104 mmol/L      CO2 26 mmol/L      ANION GAP 6 mmol/L      BUN 26 mg/dL      Creatinine 1.72 mg/dL      Glucose 123 mg/dL      Calcium 8.3 mg/dL      eGFR 44 ml/min/1.73sq m     Narrative:      National Kid[employer] Foundation guidelines for Chronic Kidney Disease (CKD):     Stage 1 with normal or high GFR (GFR > 90 mL/min/1.73 square meters)    Stage 2 Mild CKD (GFR = 60-89 mL/min/1.73 square meters)    Stage 3A Moderate CKD (GFR = 45-59 mL/min/1.73 square meters)    Stage 3B Moderate CKD (GFR = 30-44 mL/min/1.73 square meters)    Stage 4 Severe CKD (GFR = 15-29 mL/min/1.73 square meters)    Stage 5 End Stage CKD (GFR <15 mL/min/1.73 square meters)  Note: GFR calculation is accurate only with a steady state creatinine    CBC and differential [[id number]]  (Abnormal) Collected: 06/02/24 0428    Lab Status: Final result Specimen: Blood from Arm, Right  Updated: 06/02/24 0503     WBC 8.53 Thousand/uL      RBC 5.00 Million/uL      Hemoglobin 15.7 g/dL      Hematocrit 45.3 %      MCV 91 fL      MCH 31.4 pg      MCHC 34.7 g/dL      RDW 12.1 %      MPV 11.1 fL      Platelets 178 Thousands/uL      nRBC 0 /100 WBCs      Segmented % 79 %      Immature Grans % 0 %      Lymphocytes % 12 %      Monocytes % 8 %      Eosinophils Relative 0 %      Basophils Relative 1 %      Absolute Neutrophils 6.70 Thousands/µL      Absolute Immature Grans 0.02 Thousand/uL      Absolute Lymphocytes 1.06 Thousands/µL      Absolute Monocytes 0.67 Thousand/µL      Eosinophils Absolute 0.03 Thousand/µL      Basophils Absolute 0.05 Thousands/µL     Comprehensive metabolic panel [[id number]]  (Abnormal) Collected: 06/01/24 2307    Lab Status: Final result Specimen: Blood from Arm, Left Updated: 06/01/24 2352     Sodium 140 mmol/L      Potassium 3.8 mmol/L      Chloride 104 mmol/L      CO2 25 mmol/L      ANION GAP 11 mmol/L      BUN 25 mg/dL      Creatinine 1.53 mg/dL      Glucose 157 mg/dL      Calcium 9.1 mg/dL      AST 25 U/L      ALT 34 U/L      Alkaline Phosphatase 53 U/L      Total Protein 6.8 g/dL      Albumin 4.5 g/dL      Total Bilirubin 0.77 mg/dL      eGFR 50 ml/min/1.73sq m     Narrative:      National Kid[employer] Disease Foundation guidelines for Chronic Kidney Disease (CKD):     Stage 1 with normal or high GFR (GFR > 90 mL/min/1.73 square meters)    Stage 2 Mild CKD (GFR = 60-89 mL/min/1.73 square meters)    Stage 3A Moderate CKD (GFR = 45-59 mL/min/1.73 square meters)    Stage 3B Moderate CKD (GFR = 30-44 mL/min/1.73 square meters)    Stage 4 Severe CKD (GFR = 15-29 mL/min/1.73 square meters)    Stage 5 End Stage CKD (GFR <15 mL/min/1.73 square meters)  Note: GFR calculation is accurate only with a steady state creatinine    Urine Microscopic [[id number]]  (Abnormal) Collected: 06/01/24 2310    Lab Status: Final result Specimen: Urine, Clean Catch Updated: 06/01/24 2342     RBC, UA 10-20  /hpf      WBC, UA None Seen /hpf      Epithelial Cells None Seen /hpf      Bacteria, UA None Seen /hpf     CBC and differential [[id number]]  (Abnormal) Collected: 06/01/24 2307    Lab Status: Final result Specimen: Blood from Arm, Left Updated: 06/01/24 2321     WBC 12.27 Thousand/uL      RBC 5.29 Million/uL      Hemoglobin 16.8 g/dL      Hematocrit 47.5 %      MCV 90 fL      MCH 31.8 pg      MCHC 35.4 g/dL      RDW 12.0 %      MPV 10.8 fL      Platelets 197 Thousands/uL      nRBC 0 /100 WBCs      Segmented % 71 %      Immature Grans % 0 %      Lymphocytes % 19 %      Monocytes % 7 %      Eosinophils Relative 2 %      Basophils Relative 1 %      Absolute Neutrophils 8.71 Thousands/µL      Absolute Immature Grans 0.05 Thousand/uL      Absolute Lymphocytes 2.30 Thousands/µL      Absolute Monocytes 0.90 Thousand/µL      Eosinophils Absolute 0.24 Thousand/µL      Basophils Absolute 0.07 Thousands/µL     POCT urinalysis dipstick [[id number]]  (Normal) Resulted: 06/01/24 2312    Lab Status: Final result Updated: 06/01/24 2312     Color, UA Yellow     Clarity, UA --     EXT Leukocytes, UA --     Nitrite, UA --     Protein, UA -- mg/dl      Glucose, UA --     Ketones, UA -- mg/dl      EXT Urobilinogen, UA --      Bilirubin, UA --     Blood, UA --    Urine Macroscopic, POC [[id number]]  (Abnormal) Collected: 06/01/24 2310    Lab Status: Final result Specimen: Urine Updated: 06/01/24 2312     Color, UA Yellow     Clarity, UA Clear     pH, UA 6.5     Leukocytes, UA Negative     Nitrite, UA Negative     Protein, UA Negative mg/dl      Glucose, UA Negative mg/dl      Ketones, UA Negative mg/dl      Urobilinogen, UA 0.2 E.U./dl      Bilirubin, UA Negative     Occult Blood, UA Moderate     Specific Gravity, UA 1.025    Narrative:      CLINITEK RESULT                   CT renal stone study abdomen pelvis wo contrast   Final Result by Jean Joshua MD (06/02 0813)      Interval development of mild right-sided hydroureteronephrosis  secondary to a 4 mm calculus in the proximal right ureter. Right-sided perinephric and periureteral stranding.               Workstation performed: [id number]         FL retrograde pyelogram    (Results Pending)         Procedures  Procedures      ED Course                             SBIRT 22yo+      Flowsheet Row Most Recent Value   Initial Alcohol Screen: US AUDIT-C     1. How often do you have a drink containing alcohol? 0 Filed at: 06/01/2024 2352   2. How many drinks containing alcohol do you have on a typical day you are drinking?  0 Filed at: 06/01/2024 2352   3a. Male UNDER 65: How often do you have five or more drinks on one occasion? 0 Filed at: 06/01/2024 2352   Audit-C Score 0 Filed at: 06/01/2024 2352   GARRISON: How many times in the past year have you...    Used an illegal drug or used a prescription medication for non-medical reasons? Never Filed at: 06/01/2024 2352                  Medical Decision Making  The patient is a 53 y.o. male with a history of nephrolithiasis who presents to the Emergency Department with a chief complaint of R sided flank pain, nausea and vomiting.    Ddx: ureterolithiasis, obstructing stone, cystitis.    Patient with known R sided stone on recent imaging. Will obtain labs, urine, treat patients pain and nausea.    If urine negative and labs reassuring, will plan to treat symptomatically and re-assess for improvement in condition.   If able to control patient's nausea and pain, will plan to discharge patient home without repeat CT imaging.  If symptoms uncontrolled, or if signs of infection/GLADYS, will plan to either scan and consult urology or consult urology for intervention.    Patient signed out to Dr. Miriam Moffett pending resulting labs and urine.     Amount and/or Complexity of Data Reviewed  Labs: ordered.    Risk  Prescription drug management.  Decision regarding hospitalization.          Disposition  Final diagnoses:   Nephrolithiasis   GLADYS (acute kidney injury) (HCC)      Time reflects when diagnosis was documented in both MDM as applicable and the Disposition within this note       Time User Action Codes Description Comment    6/2/2024  1:43 AM Miriam Moffett [N20.0] Nephrolithiasis     6/2/2024  1:43 AM Miriam Moffett [N17.9] GLADYS (acute kidney injury) (HCC)           ED Disposition       ED Disposition   Admit    Condition   Stable    Date/Time   Sun Jun 2, 2024 0058    Comment   --             Follow-up Information    None         Current Discharge Medication List        CONTINUE these medications which have NOT CHANGED    Details   aspirin (ECOTRIN LOW STRENGTH) 81 mg EC tablet Take 81 mg by mouth daily      celecoxib (CeleBREX) 200 mg capsule Take 1 capsule (200 mg total) by mouth 2 (two) times a day for 14 days  Qty: 28 capsule, Refills: 0    Associated Diagnoses: Back pain      cholecalciferol (VITAMIN D3) 1,000 units tablet Take 1 tablet (1,000 Units total) by mouth daily  Qty: 30 tablet, Refills: 3    Associated Diagnoses: Chronic left-sided low back pain with left-sided sciatica      Diclofenac Sodium (VOLTAREN) 1 % Apply 2 g topically 4 (four) times a day For lower back pain.  Qty: 100 g, Refills: 3    Associated Diagnoses: Chronic left-sided low back pain with left-sided sciatica      gabapentin (Neurontin) 100 mg capsule Take 3 capsules (300 mg total) by mouth daily at bedtime AND 1 capsule (100 mg total) every morning.  Qty: 120 capsule, Refills: 3    Associated Diagnoses: Chronic left-sided low back pain with left-sided sciatica      oxyCODONE (Roxicodone) 5 immediate release tablet Take 1 tablet (5 mg total) by mouth every 6 (six) hours as needed for moderate pain for up to 20 doses Max Daily Amount: 20 mg  Qty: 20 tablet, Refills: 0    Associated Diagnoses: Kidney stone      tadalafil (CIALIS) 5 MG tablet Take 1 tablet (5 mg total) by mouth in the morning  Qty: 30 tablet, Refills: 0    Associated Diagnoses: Dysuria; Benign prostatic hyperplasia, unspecified  whether lower urinary tract symptoms present      tamsulosin (FLOMAX) 0.4 mg Take 1 capsule (0.4 mg total) by mouth daily with dinner  Qty: 10 capsule, Refills: 0    Associated Diagnoses: Kidney stone           No discharge procedures on file.    PDMP Review       None             ED Provider  Attending physically available and evaluated Hiral Cullen. I managed the patient along with the ED Attending.    Electronically Signed by           Briana Mueller MD  06/02/24 7209

## 2024-06-02 NOTE — ASSESSMENT & PLAN NOTE
Patient presenting with acute kidney injury, evidenced by increase in serum creatinine > 0.3 mg/dL within 48 hours and increase in serum creatinine > 1.5 times baseline within the last 7 days, with baseline creatinine ~1.0.    Suspect etiology is postrenal with obstructive nephropathy given history of benign prostatic hyperplasia with superimposed urinary tract obstruction.  Now resolved.    Plan:  avoid nephrotoxic medications  optimize BP to prevent further progression of kidney disease  monitor BMP  monitor electrolytes  monitor intake and urine output  Urology consultation

## 2024-06-02 NOTE — PROGRESS NOTES
INTERNAL MEDICINE RESIDENCY SENIOR ADMISSION NOTE     Name: Hiral Cullen   Age & Sex: 53 y.o. male   MRN: [id number]  Unit/Bed#: [facility] 212-01   Encounter: [id number]  Primary Care Provider: Kris Shell MD    Admit to team: SOD Team A    Patient seen and examined. Reviewed H&P per Dr. Nicholson . Agree with the assessment and plan with any exception/addition as noted below:    Patient is a 53-year-old male with past medical history of erythrocytosis, bilateral nonobstructive nephrolithiasis without hydronephrosis, BPH, chronic back pain who presents for right flank pain. He presents with unbearable R flank pain similar to when he had L flank pain and had urologic procedures done in his country >2 years ago. No fevers or chills. Patient with WBC of 12.27 K baseline white count of 5K.  Creatinine elevated to 1.5 from baseline of 0.9.  UA with RBC 10-20 but no WBC or bacteria. No new imaging in ED, but last imaging in 4/28 L UVJ 6 mm calculus with mild hydroureter.    A/P:  Principal Problem:    Nephrolithiasis  Active Problems:    GLADYS (acute kidney injury) (HCC)    Polycythemia    Plan:  - obtain CT renal stone study; r/o hydronephrosis ; stranding/infection  - Urology consulted  - NPO for possible Cystoscopy  - continue tadalafil / tamsulosin  - tylenol, oxy 2.5/5, dilaudid   - start maintenance fluids for stone and GLADYS  - UA -- f/u UCX to r/o or treat UTI  - GLADYS likely post obstructive      Code Status: Level 1 - Full Code  Admission Status: INPATIENT   Disposition: Patient requires Med/Surg  Expected Length of Stay: >2 nights

## 2024-06-02 NOTE — CONSULTS
Inpatient consult to Urology  Consult performed by: Sharyn Persaud PA-C  Consult ordered by: Augusto Campo MD      : UROLOGY  Hiral Cullen 53 y.o. male [id number]   Unit/Bed #: CW2 212-01  Encounter: [id number]        Assessment  & Plan  :  Assessment & Plan   Polycythemia  Assessment & Plan  Per primary team    GLADYS (acute kidney injury) (HCC)  Assessment & Plan  Patient with a baseline creatinine of 0.9 presenting with creatinine of 1.53, history of nephrolithiasis undergoing workup.  -Continue to trend per primary team  -IV fluids.  -Avoid nephrotoxins    * Nephrolithiasis  Assessment & Plan  Patient presenting with abdominal pain and right-sided flank pain with history of nephrolithiasis.  Last CT imaging from 4/28/2024 over 1-month ago which was positive for left-sided nephrolithiasis.  Now presenting with pain on opposite side.    -Discontinued order for kidney and ultrasound PVR, ordered CT renal stone study for further evaluation of  track and for surgical planning.  -Continue with IV fluids  -Flomax  -Antiemetics  -Analgesics  -Keep n.p.o. for now pending CT imaging and OR schedule availability.  Timing will be ultimately up to urology service.      Addendum: Upon review of CT scan ureteral calculus noted in the mid proximal ureter.  With worsening GLADYS.  Plan for OR today.    Subjective :    Hiral Cullen a 53-year-old male presenting due to abdominal pain and right-sided flank pain with a history of nephrolithiasis previously with 6 mm left ureteral calculus.  In April 2024.  With CT findings of 5 mm intrarenal calculus on CT imaging.  ED workup revealed GLADYS 1.53, leukocytosis 12.27, UA negative for infection.  However no imaging obtained in the ED.  Urology consulted for further evaluation in regards to abdominal pain and history.  Patient evaluated at bedside.  Reporting that he continues to have right-sided flank pain but it is improved radiates down into his abdomen  and also has pain with urination and in the testicle.  Reports he has a prior history of kidney stones has required prior surgical procedures for stone removal on the left side.  [facility]      No Known Allergies   Current Outpatient Medications   Medication Instructions    aspirin (ECOTRIN LOW STRENGTH) 81 mg, Oral, Daily    celecoxib (CELEBREX) 200 mg, Oral, 2 times daily    cholecalciferol (VITAMIN D3) 1,000 Units, Oral, Daily    Diclofenac Sodium (VOLTAREN) 2 g, Topical, 4 times daily, For lower back pain.    gabapentin (Neurontin) 100 mg capsule Take 3 capsules (300 mg total) by mouth daily at bedtime AND 1 capsule (100 mg total) every morning.    oxyCODONE (ROXICODONE) 5 mg, Oral, Every 6 hours PRN    tadalafil (CIALIS) 5 mg, Oral, Daily    tamsulosin (FLOMAX) 0.4 mg, Oral, Daily with dinner      History reviewed. No pertinent past medical history.  Past Surgical History:   Procedure Laterality Date    BLADDER STONE REMOVAL       History reviewed. No pertinent family history.  Social History     Socioeconomic History    Marital status: /Civil Union     Spouse name: None    Number of children: None    Years of education: None    Highest education level: None   Occupational History    None   Tobacco Use    Smoking status: Never    Smokeless tobacco: Never   Vaping Use    Vaping status: Never Used   Substance and Sexual Activity    Alcohol use: Not Currently    Drug use: Never    Sexual activity: None   Other Topics Concern    None   Social History Narrative    None     Social Determinants of Health     Financial Resource Strain: Low Risk  (2/2/2024)    Overall Financial Resource Strain (CARDIA)     Difficulty of Paying Living Expenses: Not hard at all   Food Insecurity: No Food Insecurity (2/2/2024)    Hunger Vital Sign     Worried About Running Out of Food in the Last Year: Never true     Ran Out of Food in the Last Year: Never true   Transportation Needs: No Transportation Needs (2/2/2024)     PRAPARE - Transportation     Lack of Transportation (Medical): No     Lack of Transportation (Non-Medical): No   Physical Activity: Not on file   Stress: Not on file   Social Connections: Not on file   Intimate Partner Violence: Not on file   Housing Stability: Low Risk  (3/28/2024)    Housing Stability Vital Sign     Unable to Pay for Housing in the Last Year: No     Number of Places Lived in the Last Year: 1     Unstable Housing in the Last Year: No        Review of Systems     Objective     Physical Exam  Constitutional:       General: He is not in acute distress.     Appearance: He is normal weight. He is not ill-appearing, toxic-appearing or diaphoretic.   HENT:      Head: Normocephalic and atraumatic.      Right Ear: External ear normal.      Left Ear: External ear normal.      Nose: Nose normal.      Mouth/Throat:      Pharynx: Oropharynx is clear.   Eyes:      General: No scleral icterus.     Conjunctiva/sclera: Conjunctivae normal.   Cardiovascular:      Rate and Rhythm: Normal rate and regular rhythm.      Pulses: Normal pulses.      Heart sounds: No murmur heard.     No friction rub. No gallop.   Pulmonary:      Effort: Pulmonary effort is normal. No respiratory distress.      Breath sounds: No wheezing, rhonchi or rales.   Abdominal:      General: Bowel sounds are normal. There is no distension.      Tenderness: There is abdominal tenderness. There is right CVA tenderness.   Musculoskeletal:         General: Normal range of motion.      Cervical back: Normal range of motion.   Skin:     General: Skin is warm and dry.   Neurological:      General: No focal deficit present.      Mental Status: He is alert and oriented to person, place, and time.                Imaging: CT imaging from 4/28/2024    CT ABDOMEN AND PELVIS WITH IV CONTRAST     INDICATION: Lower abdominal/penile pain.     COMPARISON: Kidney and bladder ultrasound 03/13/2024     TECHNIQUE: CT examination of the abdomen and pelvis was performed.  Multiplanar 2D reformatted images were created from the source data.     This examination, like all CT scans performed in the [facility], was performed utilizing techniques to minimize radiation dose exposure, including the use of iterative reconstruction and automated exposure control. Radiation dose length   product (DLP) for this visit: 689.21 mGy-cm     IV Contrast: 100 mL of iohexol (OMNIPAQUE)  Enteric Contrast: Not administered.     FINDINGS:     ABDOMEN     LOWER CHEST: No clinically significant abnormality in the visualized lower chest.     LIVER/BILIARY TREE: Unremarkable.     GALLBLADDER: Post cholecystectomy.     SPLEEN: Unremarkable.     PANCREAS: Unremarkable.     ADRENAL GLANDS: Unremarkable.     KIDNEYS/URETERS: There is a 6 mm calculus at the left ureterovesicular junction (series 2, image 165) with associated mild hydroureter and urothelial enhancement of the left distal ureter.     Right lower pole 5 mm nonobstructing calculus (series 2, image 86). Subcentimeter simple renal cortical cyst in the right mid kidney. No hydronephrosis.     STOMACH AND BOWEL: Unremarkable.     APPENDIX: Normal. Some high density material seen within the appendiceal lumen but without inflammatory change.     ABDOMINOPELVIC CAVITY: No ascites. No pneumoperitoneum. No lymphadenopathy.     VESSELS: Mild atherosclerosis without abdominal aortic aneurysm.     PELVIS     REPRODUCTIVE ORGANS: Unremarkable for patient's age.     URINARY BLADDER: 6 mm calculus at the left UVJ as above. Otherwise unremarkable.     ABDOMINAL WALL/INGUINAL REGIONS: Small periumbilical hernia fat.     BONES: No acute fracture or suspicious osseous lesion. Spinal degenerative changes. Sclerotic lesion in the right ilium probably represents bone island.     IMPRESSION:     Left ureterovesicular junction 6 mm calculus with associated mild hydroureter and urothelial enhancement of the left distal ureter which may be secondary to recent  stone passage versus ascending infection.     Additional nonobstructing right renal lower pole 5 mm calculus. Recommend urologic follow up.        The study was marked in EPIC for immediate notification.     I have personally reviewed this study including all images.  / SAVANA        Resident: RONAK DIA I, the attending radiologist, have reviewed the images and agree with the final report above.    Labs:  Lab Results   Component Value Date    SODIUM 140 06/01/2024    K 3.8 06/01/2024     06/01/2024    CO2 25 06/01/2024    BUN 25 06/01/2024    CREATININE 1.53 (H) 06/01/2024    GLUC 157 (H) 06/01/2024    CALCIUM 9.1 06/01/2024         Lab Results   Component Value Date    WBC 12.27 (H) 06/01/2024    HGB 16.8 06/01/2024    HCT 47.5 06/01/2024    MCV 90 06/01/2024     06/01/2024         VTE Pharmacologic Prophylaxis: Heparin  VTE Mechanical Prophylaxis: sequential compression device     Sharyn Persaud PA-C

## 2024-06-02 NOTE — PHYSICAL THERAPY NOTE
Physical Therapy Cancellation Note         06/02/24 1030   PT Last Visit   PT Visit Date 06/02/24   Note Type   Note type Cancelled Session;Evaluation   Cancel Reasons Patient to operating room  (for cystoscopy - will see post op as appropriate and able)         Ira Page, PT, DPT

## 2024-06-02 NOTE — ASSESSMENT & PLAN NOTE
"Patient has history of polycythemia of unclear etiology.  Per chart review, patient was previously living in [facility] and had been told he has \"thick blood\", had been taking 1 baby aspirin daily.  Was partially attributed to him living [place] higher altitude (approximately [place]), and hemoglobin has trended downward slightly over the last several months since moving here.    Unknown if he has any family history of polycythemia vera or any hematologic malignancies.    Plan:  Pending hemochromatosis workup as outpatient, can also consider investigating for JAK2 mutation if pursuing genetic workup  Continue aspirin 81 mg daily  "

## 2024-06-02 NOTE — ASSESSMENT & PLAN NOTE
Patient has a history of nephrolithiasis s/p stenting in the past with recurrent UTIs, had passed a kidney stone approximately 1 to 2 months ago and recently found to have microscopic hematuria again - CT from 4/28/2024 was notable for an obstructing left 6 mm UVJ stone, and a nonobstructing right renal lower pole 5 mm stone.  Patient notes he has had urologic instrumentation in the past, with cystoscopy and an unspecified urologic surgery involving his bladder.    In the ED, afebrile and normotensive, not tachycardic.  On exam, tender over the right flank with suprapubic scar from prior surgery.  Chemistry notable for creatinine of 1.53, consistent with GLADYS.  CBC notable for leukocytosis of 12,000, with urinalysis showing moderate blood; no WBCs or bacteria.  No prior urine culture data available for review.    CT Abdomen/Pelvis w/ contrast 4/28/24  Left UVJ 6 mm calculus with associated mild hydroureter  Urothelial enhancement of the left distal ureter, possibly secondary to recent stone passage vs ascending infection  Nonobstructing right renal lower pole 5 mm calculus    Plan:  Urology consult  Follow-up on CT renal stone study  N.p.o. for possible intervention  Continue home tadalafil and tamsulosin  Tylenol, oxycodone 2.5/5 and breakthrough Dilaudid ordered for pain control  Zofran as needed for nausea  IV fluid hydration with isolyte 125 cc/h  Holding off on antibiotics, low suspicion for sepsis and pyelonephritis, but low threshold to initiate should he clinically worsen  Discharge w/ 3 day course of Keflex + 5 doses of Norco for pain, per urology

## 2024-06-02 NOTE — OP NOTE
OPERATIVE REPORT  PATIENT NAME: Hiral Cullen    :  1970  MRN: [id number]  Pt Location:  CYSTO ROOM 01    SURGERY DATE: 2024    Surgeons and Role:     * Javi Bsihop MD - Primary    Preop Diagnosis:  Nephrolithiasis [N20.0]    Post-Op Diagnosis Codes:     * Nephrolithiasis [N20.0]    Procedure(s):  Right - CYSTOSCOPY URETEROSCOPY WITH LITHOTRIPSY HOLMIUM LASER. RETROGRADE PYELOGRAM AND INSERTION STENT URETERAL    Specimen(s):  ID Type Source Tests Collected by Time Destination   B :  Urine Urine, Other URINE CULTURE Javi Bishop MD 2024 [id number]        Estimated Blood Loss:   Minimal    Drains:  Right 24-6 Filipino double-J ureteral stent with string    Anesthesia Type:   General    Operative Indications:  Nephrolithiasis [N20.0]      Operative Findings:  Right proximal ureteral calculus pushed back into the kidney.  The stone was then decimated with a 272 µm holmium laser fiber until all fragments were small enough to be passable.      Complications:   None    Procedure and Technique:      Procedure Description: Hiral Cullen is a 53 y.o.-year-old male with a history of right flank pain.  CT scan in the emergency room shows a 4-6 millimeter right proximal ureteral calculus with right-sided hydronephrosis.  Fortunately he has no sign of infection, fever, or leukocytosis.  I recommend cystoscopy with right ureteroscopy with holmium laser lithotripsy, right retrograde pyelography and right ureteral stent insertion.  Risk the procedure including, but not limited to, bleeding, infection, sepsis, ureteral injury, and need for additional surgery were discussed and reviewed.   Informed consent was obtained.    The patient was brought to the operating room on 2024. After the smooth induction of general LMA anesthesia, the patient was placed in the dorsal lithotomy position.  His genitalia was prepped and draped in a sterile fashion. Intravenous antibiotics  were administered. A timeout was performed with all members of the operative team confirmed the patient's identity, procedure to be performed, and laterality of the case.    A 22 Fijian rigid cystoscope with 30° lens was inserted. The bladder was thoroughly inspected. It was no evidence of mucosal abnormalities or lesions. There were no calculi identified. Attention was focused on the right ureteral orifice. A 5 Fijian open-ended catheter was inserted and a right retrograde pyelogram was performed. A filling defect in the right proximal ureter was identified consistent with the stone. A wire was passed proximal to the stone and secured as a safety. A long semirigid ureteroscope was then inserted adjacent to the wire.  The scope was passed all the way to the right UPJ.  The stone was not visualized but the region where the stone had been lodged in the ureter was noted.  The stone was most likely pushed back into the kidney.  I therefore passed a second wire followed by a 12/14 x 35 cm ureteral access sheath followed by an Olympus 8.5 Fijian high-definition flexible ureteroscope.  Stone was identified engaged in an upper pole calyx.  The stone was decimated with a 272 µm holmium laser fiber until all fragments were small enough to be passable.  Contrast was injected.  All calyces were thoroughly reinspected.  No sizable residual stones were identified.    The ureteroscope and access sheath were slowly removed.  The ureter was normal.  There were no residual stones..  The wire was backloaded through the cystoscope. The cystoscope was repassed into the bladder. A 24-6 Fijian right double-J ureteral stent was then placed without difficulty. The proximal coil was appreciated in the right renal pelvis and the distal coil was visualized within the bladder. The bladder was emptied and the cystoscope was removed. A string was left in place and secured to the dorsum of the phallus with Tegaderm.    Overall the patient  tolerated the procedure well and were no complications. The patient was extubated in the operating room and transferred to the PACU in stable condition at the conclusion of the case.      Patient Disposition:  PACU         SIGNATURE: Javi Bishop MD  DATE: June 2, 2024  TIME: 12:21 PM

## 2024-06-02 NOTE — ASSESSMENT & PLAN NOTE
Patient with a baseline creatinine of 0.9 presenting with creatinine of 1.53, history of nephrolithiasis undergoing workup.  -Continue to trend per primary team  -IV fluids.  -Avoid nephrotoxins

## 2024-06-02 NOTE — PLAN OF CARE
Problem: PAIN - ADULT  Goal: Verbalizes/displays adequate comfort level or baseline comfort level  Description: Interventions:  - Encourage patient to monitor pain and request assistance  - Assess pain using appropriate pain scale  - Administer analgesics based on type and severity of pain and evaluate response  - Implement non-pharmacological measures as appropriate and evaluate response  - Consider cultural and social influences on pain and pain management  - Notify physician/advanced practitioner if interventions unsuccessful or patient reports new pain  Outcome: Progressing     Problem: INFECTION - ADULT  Goal: Absence or prevention of progression during hospitalization  Description: INTERVENTIONS:  - Assess and monitor for signs and symptoms of infection  - Monitor lab/diagnostic results  - Monitor all insertion sites, i.e. indwelling lines, tubes, and drains  - Monitor endotracheal if appropriate and nasal secretions for changes in amount and color  - [place] appropriate cooling/warming therapies per order  - Administer medications as ordered  - Instruct and encourage patient and family to use good hand hygiene technique  - Identify and instruct in appropriate isolation precautions for identified infection/condition  Outcome: Progressing     Problem: DISCHARGE PLANNING  Goal: Discharge to home or other facility with appropriate resources  Description: INTERVENTIONS:  - Identify barriers to discharge w/patient and caregiver  - Arrange for needed discharge resources and transportation as appropriate  - Identify discharge learning needs (meds, wound care, etc.)  - Arrange for interpretive services to assist at discharge as needed  - Refer to Case Management Department for coordinating discharge planning if the patient needs post-hospital services based on physician/advanced practitioner order or complex needs related to functional status, cognitive ability, or social support system  Outcome: Progressing      Problem: Knowledge Deficit  Goal: Patient/family/caregiver demonstrates understanding of disease process, treatment plan, medications, and discharge instructions  Description: Complete learning assessment and assess knowledge base.  Interventions:  - Provide teaching at level of understanding  - Provide teaching via preferred learning methods  Outcome: Progressing

## 2024-06-03 VITALS
DIASTOLIC BLOOD PRESSURE: 64 MMHG | TEMPERATURE: 98.9 F | WEIGHT: 195 LBS | HEART RATE: 63 BPM | OXYGEN SATURATION: 95 % | SYSTOLIC BLOOD PRESSURE: 116 MMHG | HEIGHT: 63 IN | BODY MASS INDEX: 34.55 KG/M2 | RESPIRATION RATE: 18 BRPM

## 2024-06-03 LAB
ANION GAP SERPL CALCULATED.3IONS-SCNC: 8 MMOL/L (ref 4–13)
BACTERIA UR CULT: NORMAL
BASOPHILS # BLD AUTO: 0.02 THOUSANDS/ÂΜL (ref 0–0.1)
BASOPHILS NFR BLD AUTO: 0 % (ref 0–1)
BUN SERPL-MCNC: 22 MG/DL (ref 5–25)
CALCIUM SERPL-MCNC: 8 MG/DL (ref 8.4–10.2)
CHLORIDE SERPL-SCNC: 108 MMOL/L (ref 96–108)
CO2 SERPL-SCNC: 23 MMOL/L (ref 21–32)
CREAT SERPL-MCNC: 1.12 MG/DL (ref 0.6–1.3)
EOSINOPHIL # BLD AUTO: 0.08 THOUSAND/ÂΜL (ref 0–0.61)
EOSINOPHIL NFR BLD AUTO: 1 % (ref 0–6)
ERYTHROCYTE [DISTWIDTH] IN BLOOD BY AUTOMATED COUNT: 12.1 % (ref 11.6–15.1)
GFR SERPL CREATININE-BSD FRML MDRD: 74 ML/MIN/1.73SQ M
GLUCOSE SERPL-MCNC: 111 MG/DL (ref 65–140)
HCT VFR BLD AUTO: 43.3 % (ref 36.5–49.3)
HGB BLD-MCNC: 14.8 G/DL (ref 12–17)
IMM GRANULOCYTES # BLD AUTO: 0.02 THOUSAND/UL (ref 0–0.2)
IMM GRANULOCYTES NFR BLD AUTO: 0 % (ref 0–2)
LYMPHOCYTES # BLD AUTO: 1.27 THOUSANDS/ÂΜL (ref 0.6–4.47)
LYMPHOCYTES NFR BLD AUTO: 15 % (ref 14–44)
MCH RBC QN AUTO: 31.2 PG (ref 26.8–34.3)
MCHC RBC AUTO-ENTMCNC: 34.2 G/DL (ref 31.4–37.4)
MCV RBC AUTO: 91 FL (ref 82–98)
MONOCYTES # BLD AUTO: 0.71 THOUSAND/ÂΜL (ref 0.17–1.22)
MONOCYTES NFR BLD AUTO: 9 % (ref 4–12)
NEUTROPHILS # BLD AUTO: 6.17 THOUSANDS/ÂΜL (ref 1.85–7.62)
NEUTS SEG NFR BLD AUTO: 75 % (ref 43–75)
NRBC BLD AUTO-RTO: 0 /100 WBCS
PLATELET # BLD AUTO: 170 THOUSANDS/UL (ref 149–390)
PMV BLD AUTO: 11.1 FL (ref 8.9–12.7)
POTASSIUM SERPL-SCNC: 3.7 MMOL/L (ref 3.5–5.3)
RBC # BLD AUTO: 4.74 MILLION/UL (ref 3.88–5.62)
SODIUM SERPL-SCNC: 139 MMOL/L (ref 135–147)
WBC # BLD AUTO: 8.27 THOUSAND/UL (ref 4.31–10.16)

## 2024-06-03 PROCEDURE — 97167 OT EVAL HIGH COMPLEX 60 MIN: CPT

## 2024-06-03 PROCEDURE — 85025 COMPLETE CBC W/AUTO DIFF WBC: CPT | Performed by: UROLOGY

## 2024-06-03 PROCEDURE — 97163 PT EVAL HIGH COMPLEX 45 MIN: CPT

## 2024-06-03 PROCEDURE — 80048 BASIC METABOLIC PNL TOTAL CA: CPT | Performed by: UROLOGY

## 2024-06-03 PROCEDURE — 99238 HOSP IP/OBS DSCHRG MGMT 30/<: CPT | Performed by: INTERNAL MEDICINE

## 2024-06-03 RX ORDER — CEPHALEXIN 500 MG/1
500 CAPSULE ORAL EVERY 6 HOURS SCHEDULED
Qty: 12 CAPSULE | Refills: 0 | Status: SHIPPED | OUTPATIENT
Start: 2024-06-03 | End: 2024-06-07

## 2024-06-03 RX ORDER — HYDROCODONE BITARTRATE AND ACETAMINOPHEN 5; 325 MG/1; MG/1
1 TABLET ORAL EVERY 6 HOURS PRN
Qty: 20 TABLET | Refills: 0 | Status: SHIPPED | OUTPATIENT
Start: 2024-06-03 | End: 2024-06-07

## 2024-06-03 RX ORDER — OXYBUTYNIN CHLORIDE 5 MG/1
5 TABLET ORAL 2 TIMES DAILY
Refills: 0 | Status: CANCELLED | OUTPATIENT
Start: 2024-06-03

## 2024-06-03 RX ORDER — PHENAZOPYRIDINE HYDROCHLORIDE 100 MG/1
100 TABLET, FILM COATED ORAL 3 TIMES DAILY PRN
Qty: 10 TABLET | Refills: 0 | Status: SHIPPED | OUTPATIENT
Start: 2024-06-03

## 2024-06-03 RX ADMIN — PHENAZOPYRIDINE HYDROCHLORIDE 100 MG: 100 TABLET ORAL at 08:22

## 2024-06-03 RX ADMIN — HEPARIN SODIUM 5000 UNITS: 5000 INJECTION INTRAVENOUS; SUBCUTANEOUS at 05:34

## 2024-06-03 RX ADMIN — SENNOSIDES 8.6 MG: 8.6 TABLET, FILM COATED ORAL at 08:21

## 2024-06-03 RX ADMIN — OXYCODONE HYDROCHLORIDE 5 MG: 5 TABLET ORAL at 09:46

## 2024-06-03 RX ADMIN — OXYBUTYNIN CHLORIDE 5 MG: 5 TABLET ORAL at 08:21

## 2024-06-03 RX ADMIN — GABAPENTIN 100 MG: 100 CAPSULE ORAL at 08:21

## 2024-06-03 RX ADMIN — ASPIRIN 81 MG: 81 TABLET, COATED ORAL at 08:20

## 2024-06-03 RX ADMIN — SODIUM CHLORIDE 100 ML/HR: 0.9 INJECTION, SOLUTION INTRAVENOUS at 04:33

## 2024-06-03 RX ADMIN — Medication 1000 UNITS: at 08:21

## 2024-06-03 NOTE — PLAN OF CARE
"  Problem: PHYSICAL THERAPY ADULT  Goal: Performs mobility at highest level of function for planned discharge setting.  See evaluation for individualized goals.  Description: Treatment/Interventions: Functional transfer training, Gait training, Therapeutic exercise, Elevations, OT, Spoke to case management, Spoke to nursing          See flowsheet documentation for full assessment, interventions and recommendations.  Note: Prognosis: Good  Problem List: Decreased endurance, Pain, Decreased mobility  Assessment: Pt is a 53 y.o. male seen for PT evaluation s/p admit to [facility] on 6/1/2024. Pt was admitted with a primary dx of: Nephrolithiasis, Acute Kidney Injury, Polycythemia.Pt is s/p Cystoscopy Ureteroscopy with lithotripsy , retrograde pyelogram and ureteral stent insertion.  PT now consulted for assessment of mobility and d/c needs. Pt with Up and OOB as tolerated  orders. Pts current clinical presentation is Unstable/ Unpredictable (high complexity) due to Ongoing medical management for primary dx, Decreased activity tolerance compared to baseline, s/p surgical intervention. Prior to admission, pt was Independent for mobility and ADLs, lives with spouse in Apartment. Upon evaluation, pt currently performing bed mobility , transfers and ambulation with supervision , no AD used, limited distance 2* LE pain. Pt reports \"sciatica pain \" in L LE limiting mobility at this time.  Pt presents at PT eval functioning below baseline and currently w/ overall mobility deficits 2* to: gait deviations, pain, decreased activity tolerance compared to baseline.  Pt will continue to benefit from skilled acute PT interventions to maximize functional mobility; for ongoing pt training; and stair assessment. At conclusion of PT session pt returned back in chair, chair alarm engaged, all needs in reach, and RN notified of session findings/recommendations with phone and call bell within reach. Pt denies any further questions " at this time. The patient's AM-PAC Basic Mobility Inpatient Short Form Raw Score is 18. A Raw score of greater than 16 suggests the patient may benefit from discharge to home. Please also refer to the recommendation of the Physical Therapist for safe discharge planning. Recommend Level 3 pending progress upon hospital D/C.        Rehab Resource Intensity Level, PT: III (Minimum Resource Intensity) (pending progress)    See flowsheet documentation for full assessment.

## 2024-06-03 NOTE — PLAN OF CARE
Problem: PAIN - ADULT  Goal: Verbalizes/displays adequate comfort level or baseline comfort level  Description: Interventions:  - Encourage patient to monitor pain and request assistance  - Assess pain using appropriate pain scale  - Administer analgesics based on type and severity of pain and evaluate response  - Implement non-pharmacological measures as appropriate and evaluate response  - Consider cultural and social influences on pain and pain management  - Notify physician/advanced practitioner if interventions unsuccessful or patient reports new pain  Outcome: Adequate for Discharge     Problem: INFECTION - ADULT  Goal: Absence or prevention of progression during hospitalization  Description: INTERVENTIONS:  - Assess and monitor for signs and symptoms of infection  - Monitor lab/diagnostic results  - Monitor all insertion sites, i.e. indwelling lines, tubes, and drains  - Monitor endotracheal if appropriate and nasal secretions for changes in amount and color  - [place] appropriate cooling/warming therapies per order  - Administer medications as ordered  - Instruct and encourage patient and family to use good hand hygiene technique  - Identify and instruct in appropriate isolation precautions for identified infection/condition  Outcome: Adequate for Discharge     Problem: DISCHARGE PLANNING  Goal: Discharge to home or other facility with appropriate resources  Description: INTERVENTIONS:  - Identify barriers to discharge w/patient and caregiver  - Arrange for needed discharge resources and transportation as appropriate  - Identify discharge learning needs (meds, wound care, etc.)  - Arrange for interpretive services to assist at discharge as needed  - Refer to Case Management Department for coordinating discharge planning if the patient needs post-hospital services based on physician/advanced practitioner order or complex needs related to functional status, cognitive ability, or social support  system  Outcome: Adequate for Discharge     Problem: Knowledge Deficit  Goal: Patient/family/caregiver demonstrates understanding of disease process, treatment plan, medications, and discharge instructions  Description: Complete learning assessment and assess knowledge base.  Interventions:  - Provide teaching at level of understanding  - Provide teaching via preferred learning methods  Outcome: Adequate for Discharge

## 2024-06-03 NOTE — DISCHARGE SUMMARY
INTERNAL MEDICINE RESIDENCY DISCHARGE SUMMARY     Hiral Cullen   53 y.o. male  MRN: [id number]  Room/Bed: [facility] 815/[facility] [address] [place] [address]    Encounter: [id number]    Principal Problem:    Nephrolithiasis  Active Problems:    GLADYS (acute kidney injury) ([facility])    Polycythemia      * Nephrolithiasis  Assessment & Plan  Patient has a history of nephrolithiasis s/p stenting in the past with recurrent UTIs, had passed a kidney stone approximately 1 to 2 months ago and recently found to have microscopic hematuria again - CT from 4/28/2024 was notable for an obstructing left 6 mm UVJ stone, and a nonobstructing right renal lower pole 5 mm stone.  Patient notes he has had urologic instrumentation in the past, with cystoscopy and an unspecified urologic surgery involving his bladder.    In the ED, afebrile and normotensive, not tachycardic.  On exam, tender over the right flank with suprapubic scar from prior surgery.  Chemistry notable for creatinine of 1.53, consistent with GLADYS.  CBC notable for leukocytosis of 12,000, with urinalysis showing moderate blood; no WBCs or bacteria.  No prior urine culture data available for review.    CT Abdomen/Pelvis w/ contrast 4/28/24  Left UVJ 6 mm calculus with associated mild hydroureter  Urothelial enhancement of the left distal ureter, possibly secondary to recent stone passage vs ascending infection  Nonobstructing right renal lower pole 5 mm calculus    Plan:  Urology consult  Follow-up on CT renal stone study  N.p.o. for possible intervention  Continue home tadalafil and tamsulosin  Tylenol, oxycodone 2.5/5 and breakthrough Dilaudid ordered for pain control  Zofran as needed for nausea  IV fluid hydration with isolyte 125 cc/h  Holding off on antibiotics, low suspicion for sepsis and pyelonephritis, but low threshold to initiate should he clinically worsen    GLADYS (acute kidney injury) ([facility])  Assessment & Plan  Patient  "presenting with acute kidney injury, evidenced by increase in serum creatinine > 0.3 mg/dL within 48 hours and increase in serum creatinine > 1.5 times baseline within the last 7 days, with baseline creatinine ~1.0.    Suspect etiology is postrenal with obstructive nephropathy given history of benign prostatic hyperplasia with superimposed urinary tract obstruction.    Plan:  avoid nephrotoxic medications  optimize BP to prevent further progression of kidney disease  monitor BMP  monitor electrolytes  monitor intake and urine output  Urology consultation    Polycythemia  Assessment & Plan  Patient has history of polycythemia of unclear etiology.  Per chart review, patient was previously living in [facility] and had been told he has \"thick blood\", had been taking 1 baby aspirin daily.  Was partially attributed to him living at higher altitude (approximately [place]), and hemoglobin has trended downward slightly over the last several months since moving here.    Unknown if he has any family history of polycythemia vera or any hematologic malignancies.    Plan:  Pending hemochromatosis workup as outpatient, can also consider investigating for JAK2 mutation if pursuing genetic workup  Continue aspirin 81 mg daily        DETAILS OF HOSPITAL COURSE     H&P per Dr. Augusto Campo: \"Patient is a 53-year-old male who recently moved from [facility] within the last year, history of nephrolithiasis s/p stenting in the past with recurrent UTIs, polycythemia on aspirin and chronic lower back pain/sciatica, presents to the ED tonight for progressively worsening right lower back pain radiating to the right groin since 6 to 7 PM, associated with nausea.  Patient denies any fever/chills, vomiting, diarrhea, chest pain, shortness of breath, urinary symptoms.  Of note, he says he has had urologic instrumentation in the past, including cystoscopy and an unspecified bladder surgery/procedure.     In the ED, hemodynamically stable on " "arrival.  Was treated with Toradol, followed by Dilaudid with marked improvement in pain.  Additionally received Zofran and 1L of Isolyte.  Lab work notable for leukocytosis of 12 K with neutrophilia, UA showing 10-20 RBCs per high-powered field and chemistry showing creatinine of 1.53, elevated from baseline of approximately 1.0.  Imaging was not repeated prior to admission.     Patient placed in observation under SOD A for further pain control, IV fluid hydration, monitoring and urologic consultation.\"    Pt's hospital course was complicated by findings of L UVJ 6 mm calculus, therefore underwent stent placement and stone removal with urology on 6/2. Pt was able to tolerate procedure without complications but was found to have GLADYS that was believed to be post-obstructive. Pt was monitored overnight with improvement in renal function on 6/3. Therefore was deemed stable for discharge. On discharge, pt will continue 3 day course of keflex and take 5 doses of Norco PRN for pain, per urology.    Was also found to have polycythemia when pt was living in [facility] and has been taking 81 mg ASA daily. Has been undergoing hemochromatosis workup as outpatient. Otherwise was continued on ASA 81 mg here.    Things to follow: follow up urology appt in 3 months from discharge, removal of stent by pt on Wednesday and hemochromatosis workup outpatient.    DISCHARGE INFORMATION     Vitals:    06/02/24 2230 06/03/24 0254 06/03/24 0745 06/03/24 1103   BP: 140/78 (!) 102/48 115/61 116/64   BP Location:   Right arm Right arm   Pulse: 64 70 70 63   Resp: 18 18 22 18   Temp: 99.2 °F (37.3 °C) 99.4 °F (37.4 °C) 99.2 °F (37.3 °C) 98.9 °F (37.2 °C)   TempSrc:   Oral Oral   SpO2: 95% 94% 95% 95%   Weight:       Height:          Physical Exam  Vitals reviewed.   Constitutional:       Appearance: He is not toxic-appearing.      Comments: Patient appears moderately uncomfortable secondary to pain.   HENT:      Head: Normocephalic and atraumatic. "      Right Ear: External ear normal.      Left Ear: External ear normal.      Nose: Nose normal.      Mouth/Throat:      Mouth: Mucous membranes are moist.      Pharynx: Oropharynx is clear.   Eyes:      Extraocular Movements: Extraocular movements intact.      Pupils: Pupils are equal, round, and reactive to light.   Cardiovascular:      Rate and Rhythm: Normal rate and regular rhythm.      Pulses: Normal pulses.      Heart sounds: Normal heart sounds.   Pulmonary:      Effort: Pulmonary effort is normal. No respiratory distress.      Breath sounds: Normal breath sounds.   Abdominal:      General: There is no distension.      Palpations: Abdomen is soft.      Comments: Generalized tenderness over the right flank.  Scarring noted over suprapubic area, from prior surgery.   Musculoskeletal:      Right lower leg: No edema.      Left lower leg: No edema.   Skin:     General: Skin is warm and dry.   Neurological:      General: No focal deficit present.      Mental Status: He is alert and oriented to person, place, and time. Mental status is at baseline.   Psychiatric:         Mood and Affect: Mood normal.     PCP at Discharge: Kris Shell MD    Admitting Provider: Osorio Maldonado MD  Admission Date: 6/1/2024    Discharge Provider: Osorio Maldonado MD  Discharge Date: 6/3/2024    Discharge Disposition: Home/Self Care  Discharge Condition: stable  Discharge with Lines: no    Discharge Diet: regular diet  Activity Restrictions: none  Test Results Pending at Discharge: none    Discharge Diagnoses:  Principal Problem:    Nephrolithiasis  Active Problems:    GLADYS (acute kidney injury) ([facility])    Polycythemia  Resolved Problems:    * No resolved hospital problems. *      Consulting Providers:  Urology    Diagnostic & Therapeutic Procedures Performed:  FL retrograde pyelogram    Result Date: 6/3/2024  Impression: Fluoroscopic guidance provided for right retrograde pyelogram. Please see separate procedure report for additional  details. Workstation performed: [id number]     CT renal stone study abdomen pelvis wo contrast    Result Date: 6/2/2024  Impression: Interval development of mild right-sided hydroureteronephrosis secondary to a 4 mm calculus in the proximal right ureter. Right-sided perinephric and periureteral stranding. Workstation performed: [id number]       Code Status: Level 1 - Full Code  Advance Directive & Living Will: <no information>  Power of :    POLST:      Medications:  Current Discharge Medication List        STOP taking these medications       celecoxib (CeleBREX) 200 mg capsule Comments:   Reason for Stopping:         oxyCODONE (Roxicodone) 5 immediate release tablet Comments:   Reason for Stopping:             Current Discharge Medication List        START taking these medications    Details   cephalexin (KEFLEX) 500 mg capsule Take 1 capsule (500 mg total) by mouth every 6 (six) hours for 3 days  Qty: 12 capsule, Refills: 0    Associated Diagnoses: Nephrolithiasis      HYDROcodone-acetaminophen (Norco) 5-325 mg per tablet Take 1 tablet by mouth every 6 (six) hours as needed for pain for up to 5 days Max Daily Amount: 4 tablets  Qty: 20 tablet, Refills: 0    Associated Diagnoses: Nephrolithiasis      phenazopyridine (PYRIDIUM) 100 mg tablet Take 1 tablet (100 mg total) by mouth 3 (three) times a day as needed for bladder spasms  Qty: 10 tablet, Refills: 0    Associated Diagnoses: Nephrolithiasis           Current Discharge Medication List        CONTINUE these medications which have NOT CHANGED    Details   aspirin (ECOTRIN LOW STRENGTH) 81 mg EC tablet Take 81 mg by mouth daily      cholecalciferol (VITAMIN D3) 1,000 units tablet Take 1 tablet (1,000 Units total) by mouth daily  Qty: 30 tablet, Refills: 3    Associated Diagnoses: Chronic left-sided low back pain with left-sided sciatica      Diclofenac Sodium (VOLTAREN) 1 % Apply 2 g topically 4 (four) times a day For lower back pain.  Qty: 100 g,  "Refills: 3    Associated Diagnoses: Chronic left-sided low back pain with left-sided sciatica      gabapentin (Neurontin) 100 mg capsule Take 3 capsules (300 mg total) by mouth daily at bedtime AND 1 capsule (100 mg total) every morning.  Qty: 120 capsule, Refills: 3    Associated Diagnoses: Chronic left-sided low back pain with left-sided sciatica      tadalafil (CIALIS) 5 MG tablet Take 1 tablet (5 mg total) by mouth in the morning  Qty: 30 tablet, Refills: 0    Associated Diagnoses: Dysuria; Benign prostatic hyperplasia, unspecified whether lower urinary tract symptoms present      tamsulosin (FLOMAX) 0.4 mg Take 1 capsule (0.4 mg total) by mouth daily with dinner  Qty: 10 capsule, Refills: 0    Associated Diagnoses: Kidney stone             Allergies:  No Known Allergies    FOLLOW-UP     PCP Outpatient Follow-up:  yes      Follow up: PCP  Date and time: 1-2 weeks  Location: PCP  Follow up within next: 1-2 weeks    Consulting Providers Follow-up:  yes      Physician name: none  Specialty: Urology  Office phone number: refer to discharge paperwork  Follow up within next: 3 months     Active Issues Requiring Follow-up:   none    Discharge Statement:   I spent 30 minutes minutes discharging the patient. This time was spent on the day of discharge. I had direct contact with the patient on the day of discharge. Additional documentation is required if more than 30 minutes were spent on discharge.    Portions of the record may have been created with voice recognition software.  Occasional wrong word or \"sound a like\" substitutions may have occurred due to the inherent limitations of voice recognition software.  Read the chart carefully and recognize, using context, where substitutions have occurred.    ==  Nguyễn Hayden MD  [facility]  Internal Medicine Resident PGY-1    "

## 2024-06-03 NOTE — DISCHARGE INSTR - AVS FIRST PAGE
Discharge Instructions    Kellie un seguimiento con valdez médico de atención primaria dentro de 1-2 semana (s) después del ann, llame a la oficina si tiene alguna pregunta o problema para programar yusuf geovanny.  Kellie un seguimiento con el urólogo (médico de la vejiga) dentro de los 3 meses posteriores al ann, llame a la oficina si tiene alguna pregunta o problema para hacer yusuf geovanny.  Hemos realizado algunos cambios en kaylee medicamentos, tome estos medicamentos lauren se describe en valdez documentación y también se incluye a continuación:  Por favor, tome cefalexina 500 mg 1 comprimido por vía oral cada 6 horas  [place] según sea necesario para cualquier dolor hasta por 5 días  [place] piridio 3 veces según sea necesario para los espasmos de la vejiga.  Si continúa teniendo más síntomas de de dificultad para orinar oempeoramiento del dolor al orinar o cualquier otro síntoma que empeore, acuda al departamento de emergencias de inmediato.    
Never smoker

## 2024-06-03 NOTE — OCCUPATIONAL THERAPY NOTE
Occupational Therapy Evaluation     Patient Name: Hiral Cullen  Today's Date: 6/3/2024  Problem List  Principal Problem:    Nephrolithiasis  Active Problems:    GLADYS (acute kidney injury) ([facility])    Polycythemia    Past Medical History  History reviewed. No pertinent past medical history.  Past Surgical History  Past Surgical History:   Procedure Laterality Date    BLADDER STONE REMOVAL      FL RETROGRADE PYELOGRAM  6/2/2024 06/03/24 0942   OT Last Visit   OT Visit Date 06/03/24   Note Type   Note type Evaluation   Pain Assessment   Pain Assessment Tool 0-10   Pain Score 5   Pain Location/Orientation Location: Leg;Orientation: Left   Pain Onset/Description Onset: Ongoing;Frequency: Constant/Continuous   Effect of Pain on Daily Activities Increased time for functional mobility   Patient's Stated Pain Goal No pain   Hospital Pain Intervention(s) Repositioned;Ambulation/increased activity;Emotional support   Restrictions/Precautions   Weight Bearing Precautions Per Order No   Other Precautions Multiple lines;Fall Risk;Pain;Chair Alarm;Bed Alarm   Home Living   Type of Home Apartment   Home Layout One level;Bed/bath upstairs;Performs ADLs on one level;Stairs to enter without rails  (Basement apartment, [address], no handrails.)   Bathroom Shower/Tub Walk-in shower   Bathroom Toilet Standard   Home Equipment   (No DME)   Additional Comments basement apartment, [address], no DME PTA   Prior Function   Level of [place] Independent with ADLs;Independent with functional mobility;Independent with IADLS   Lives With Spouse   Receives Help From Family   IADLs Independent with driving;Independent with meal prep;Independent with medication management   Falls in the last 6 months 0   Vocational Full time employment   Lifestyle   Autonomy IND with ADLs/IADLs. No DME used. +.   Reciprocal Relationships Lives with spouse who works different hours every day, but can assist PRN   Service to Others Normally  "works FT, but hasn't been working recently   General   Additional Pertinent History Pt primarily Monegasque speaking, use of  via Crowdr[place] on Ipad t/o session.   Family/Caregiver Present No   Subjective   Subjective \"I have pain down my leg\"   ADL   Where Assessed Edge of bed   Eating Assistance 7  Independent   Grooming Assistance 7  Independent   UB Bathing Assistance 5  Supervision/Setup   LB Bathing Assistance 5  Supervision/Setup   LB Bathing Deficit Increased time to complete   UB Dressing Assistance 5  Supervision/Setup   UB Dressing Deficit Increased time to complete   LB Dressing Assistance 5  Supervision/Setup   LB Dressing Deficit Increased time to complete   Toileting Assistance  5  Supervision/Setup   Functional Assistance 5  Supervision/Setup   Bed Mobility   Supine to Sit 5  Supervision   Additional items Increased time required;Verbal cues;HOB elevated;Bedrails   Sit to Supine Unable to assess   Additional Comments Pt supine in bed upon arrival. SUP to sit EOB with increased time 2/2 pain. Pt seated OOB In recliner post session all needs met, call bell within reach.   Transfers   Sit to Stand 5  Supervision   Additional items Increased time required   Stand to Sit 5  Supervision   Additional items Increased time required   Additional Comments No AD in stance   Functional Mobility   Functional Mobility 5  Supervision   Additional Comments Close SUP for functional mobility, no AD. Increased time 2/2 chronic sciatica pain   Balance   Static Sitting Fair +   Dynamic Sitting Fair +   Static Standing Fair   Dynamic Standing Fair -   Ambulatory Fair -   Activity Tolerance   Activity Tolerance Patient limited by pain;Patient limited by fatigue   Medical Staff Made Aware PT rosemary Ca 2/2 increased medical complexity and comorbidities.   Nurse Made Aware RN cleared for therapy   RUE Assessment   RUE Assessment WFL   LUE Assessment   LUE Assessment WFL   Hand Function   Gross Motor Coordination " Functional   Fine Motor Coordination Functional   Cognition   Overall Cognitive Status WFL   Arousal/Participation Alert;Cooperative   Attention Within functional limits   Orientation Level Oriented X4   Memory Within functional limits   Following Commands Follows one step commands without difficulty   Comments Pt very pleasant and cooperative, motivated to participate.   Assessment   Limitation Decreased ADL status;Decreased Safe judgement during ADL;Decreased endurance;Decreased sensation;Decreased self-care trans;Decreased high-level ADLs   Prognosis Good   Assessment 53 year old pt seen today for an OT evaluation following admission to [facility] 2/2 nephrolithiasis, s/p CYSTOSCOPY URETEROSCOPY WITH LITHOTRIPSY HOLMIUM LASER. RETROGRADE PYELOGRAM AND INSERTION STENT URETERAL with present symptoms significant for pain, fatigue, weakness, decreased ADL status, decreased functional mobility. Pt has no past medical history on file. Pt reported h/o sciatica with L leg pain and tingling. Pt reported living with his wife in a basement apartment with [address]. Pt reports being IND with all ADLs/IADLs PTA with no DME used. +. Works FT prior but reports he hasn't been working recently. Pt reports that his wife works different times every day but can assist PRN when at home. Pt very pleasant and cooperative t/o session. Pt is primarily Frisian speaking,  used t/o session. Pt completed functional bed mobility with SUP, increased time 2/2 pain, fatigue. SUP for functional STS txfs with no AD. SUP for functional mobility with no AD, increased time 2/2 pain. Pt was SUP for UB ADLs and SUP +increased time 2/2 pain for LB ADLs. The patient's raw score on the AM-PAC Daily Activity Inpatient Short Form is 20. A raw score of greater than or equal to 19 suggests the patient may benefit from discharge to home. Please refer to the recommendation of the Occupational Therapist for safe discharge  planning. Pt is functioning at or near baseline level of function and no further skilled OT needs are identified. At this time, current OT recommendation is Level 4 - no needs upon d/c. Will remain available if skilled acute OT needs arise. D/c OT.   Goals   Patient Goals to go back to work   Plan   OT Frequency Eval only   Discharge Recommendation   Rehab Resource Intensity Level, OT No post-acute rehabilitation needs   AM-PAC Daily Activity Inpatient   Lower Body Dressing 3   Bathing 3   Toileting 3   Upper Body Dressing 3   Grooming 4   Eating 4   Daily Activity Raw Score 20   Daily Activity Standardized Score (Calc for Raw Score >=11) 42.03   AM-PAC Applied Cognition Inpatient   Following a Speech/Presentation 4   Understanding Ordinary Conversation 4   Taking Medications 4   Remembering Where Things Are Placed or Put Away 4   Remembering List of 4-5 Errands 4   Taking Care of Complicated Tasks 4   Applied Cognition Raw Score 24   Applied Cognition Standardized Score 62.21   End of Consult   Education Provided Yes   Patient Position at End of Consult Bedside chair;Bed/Chair alarm activated;All needs within reach   Nurse Communication Nurse aware of consult

## 2024-06-03 NOTE — PLAN OF CARE
Problem: PAIN - ADULT  Goal: Verbalizes/displays adequate comfort level or baseline comfort level  Description: Interventions:  - Encourage patient to monitor pain and request assistance  - Assess pain using appropriate pain scale  - Administer analgesics based on type and severity of pain and evaluate response  - Implement non-pharmacological measures as appropriate and evaluate response  - Consider cultural and social influences on pain and pain management  - Notify physician/advanced practitioner if interventions unsuccessful or patient reports new pain  6/3/2024 1414 by Annabelle Locke RN  Outcome: Completed  6/3/2024 1413 by Annabelle Locke RN  Outcome: Adequate for Discharge     Problem: INFECTION - ADULT  Goal: Absence or prevention of progression during hospitalization  Description: INTERVENTIONS:  - Assess and monitor for signs and symptoms of infection  - Monitor lab/diagnostic results  - Monitor all insertion sites, i.e. indwelling lines, tubes, and drains  - Monitor endotracheal if appropriate and nasal secretions for changes in amount and color  - [place] appropriate cooling/warming therapies per order  - Administer medications as ordered  - Instruct and encourage patient and family to use good hand hygiene technique  - Identify and instruct in appropriate isolation precautions for identified infection/condition  6/3/2024 1414 by Annabelle Locke RN  Outcome: Completed  6/3/2024 1413 by Annabelle Locke RN  Outcome: Adequate for Discharge     Problem: DISCHARGE PLANNING  Goal: Discharge to home or other facility with appropriate resources  Description: INTERVENTIONS:  - Identify barriers to discharge w/patient and caregiver  - Arrange for needed discharge resources and transportation as appropriate  - Identify discharge learning needs (meds, wound care, etc.)  - Arrange for interpretive services to assist at discharge as needed  - Refer to Case Management Department for coordinating discharge planning if  the patient needs post-hospital services based on physician/advanced practitioner order or complex needs related to functional status, cognitive ability, or social support system  6/3/2024 1414 by Annabelle Locke RN  Outcome: Completed  6/3/2024 1413 by Annabelle Locke RN  Outcome: Adequate for Discharge     Problem: Knowledge Deficit  Goal: Patient/family/caregiver demonstrates understanding of disease process, treatment plan, medications, and discharge instructions  Description: Complete learning assessment and assess knowledge base.  Interventions:  - Provide teaching at level of understanding  - Provide teaching via preferred learning methods  6/3/2024 1414 by Annabelle Locke RN  Outcome: Completed  6/3/2024 1413 by Annabelle Locke RN  Outcome: Adequate for Discharge

## 2024-06-03 NOTE — PHYSICAL THERAPY NOTE
Physical Therapy Evaluation     Patient's Name: Hiral Cullen    Admitting Diagnosis  Nephrolithiasis [N20.0]  Abdominal pain [R10.9]  GLADYS (acute kidney injury) (Formerly [facility]) [N17.9]    Problem List  Patient Active Problem List   Diagnosis    History of nephrolithiasis    Nephrolithiasis    GLADYS (acute kidney injury) (Formerly [facility])    Polycythemia       Past Medical History  History reviewed. No pertinent past medical history.    Past Surgical History  Past Surgical History:   Procedure Laterality Date    BLADDER STONE REMOVAL      FL RETROGRADE PYELOGRAM  6/2/2024    SC CYSTO/URETERO W/LITHOTRIPSY &INDWELL STENT INSRT Right 6/2/2024    Procedure: CYSTOSCOPY URETEROSCOPY WITH LITHOTRIPSY HOLMIUM LASER, RETROGRADE PYELOGRAM AND INSERTION STENT URETERAL;  Surgeon: Javi Bishop MD;  [facility]: [facility];  Service: Urology        06/03/24 0943   PT Last Visit   PT Visit Date 06/03/24   Note Type   Note type Evaluation   Pain Assessment   Pain Assessment Tool 0-10   Pain Score 5   Pain Location/Orientation Location: Leg;Orientation: Left   Pain Onset/Description Onset: Ongoing   Effect of Pain on Daily Activities Increased time for activity   Patient's Stated Pain Goal No pain   Hospital Pain Intervention(s) Repositioned;Ambulation/increased activity   Restrictions/Precautions   Weight Bearing Precautions Per Order No   Other Precautions Chair Alarm;Bed Alarm;Multiple lines;Pain;Fall Risk  (Primarily Greenlandic speaking-  used during session)   Home Living   Type of Home Apartment   Home Layout One level;Stairs to enter without rails   Bathroom Shower/Tub Walk-in shower   Bathroom Toilet Standard   Home Equipment   (No DME)   Additional Comments Lives in basement apartment , with 10 JAY no railings   Prior Function   Level of [place] Independent with ADLs;Independent with functional mobility;Independent with IADLS   Lives With Spouse   Receives Help From Family   IADLs Independent with  driving;Independent with meal prep;Independent with medication management   Falls in the last 6 months 0   Vocational Full time employment  (pt has been unable to work at this time)   Comments Pt reports being Ind for mobility , reports spouse is supportive   General   Family/Caregiver Present No   Cognition   Overall Cognitive Status WFL   Arousal/Participation Alert   Attention Within functional limits   Orientation Level Oriented X4   Following Commands Follows one step commands without difficulty   Comments Pt cooperative and very pleasant during session   RLE Assessment   RLE Assessment WFL   LLE Assessment   LLE Assessment WFL  (painful for motion, pt reports h/o sciatica)   Bed Mobility   Supine to Sit 5  Supervision   Additional items Increased time required;HOB elevated;Bedrails;Verbal cues   Sit to Supine Unable to assess   Additional Comments Pt in bed upon arrival.   Transfers   Sit to Stand 5  Supervision   Additional items Increased time required   Stand to Sit 5  Supervision   Additional items Increased time required   Additional Comments no AD used   Ambulation/Elevation   Gait pattern Improper Weight shift;Decreased L stance;Excessively slow   Gait Assistance 5  Supervision   Additional items Verbal cues   Assistive Device None   Distance 75 ft   Stair Management Assistance Not tested  (2* pain)   Balance   Static Sitting Fair +   Dynamic Sitting Fair +   Static Standing Fair   Dynamic Standing Fair -   Ambulatory Fair -   Activity Tolerance   Activity Tolerance Patient limited by pain;Patient limited by fatigue   Medical Staff Made Aware Co-eval with OT 2* medical complexity and multiple co morbidities   Nurse Made Aware RN cleared pt for therapy   Assessment   Prognosis Good   Problem List Decreased endurance;Pain;Decreased mobility   Assessment Pt is a 53 y.o. male seen for PT evaluation s/p admit to [facility] on 6/1/2024. Pt was admitted with a primary dx of: Nephrolithiasis, Acute  "Kidney Injury, Polycythemia.Pt is s/p Cystoscopy Ureteroscopy with lithotripsy , retrograde pyelogram and ureteral stent insertion.  PT now consulted for assessment of mobility and d/c needs. Pt with Up and OOB as tolerated  orders. Pts current clinical presentation is Unstable/ Unpredictable (high complexity) due to Ongoing medical management for primary dx, Decreased activity tolerance compared to baseline, s/p surgical intervention. Prior to admission, pt was Independent for mobility and ADLs, lives with spouse in Apartment. Upon evaluation, pt currently performing bed mobility , transfers and ambulation with supervision , no AD used, limited distance 2* LE pain. Pt reports \"sciatica pain \" in L LE limiting mobility at this time.  Pt presents at PT eval functioning below baseline and currently w/ overall mobility deficits 2* to: gait deviations, pain, decreased activity tolerance compared to baseline.  Pt will continue to benefit from skilled acute PT interventions to maximize functional mobility; for ongoing pt training; and stair assessment. At conclusion of PT session pt returned back in chair, chair alarm engaged, all needs in reach, and RN notified of session findings/recommendations with phone and call bell within reach. Pt denies any further questions at this time. The patient's AM-PAC Basic Mobility Inpatient Short Form Raw Score is 18. A Raw score of greater than 16 suggests the patient may benefit from discharge to home. Please also refer to the recommendation of the Physical Therapist for safe discharge planning. Recommend Level 3 pending progress upon hospital D/C.   Goals   Patient Goals to go back to work   STG Expiration Date 06/17/24   Short Term Goal #1 STG 1. Pt will be able to perform bed mobility tasks with Ind in order to improve overall functional mobility and assist in safe d/c. STG 2. Pt will be able to perform functional transfer with Ind in order to improve overall functional mobility " and assist in safe d/c. STG 3. Pt will be able to ambulate at least 200 feet with least restrictive device with supervision A in order to improve overall functional mobility and assist in safe d/c. STG 4. Pt will improve sitting/standing static/dynamic balance 1/2 grade in order to improve functional mobility and assist in safe d/c.  STG 6. Pt will be able to negotiate at least 10 stairs with least restrictive device with supervision A in order to improve overall functional mobility and assist in safe d/c.   PT Treatment Day 0   Plan   Treatment/Interventions Functional transfer training;Gait training;Therapeutic exercise;Elevations;OT;Spoke to case management;Spoke to nursing   PT Frequency 2-3x/wk   Discharge Recommendation   Rehab Resource Intensity Level, PT III (Minimum Resource Intensity)  (pending progress)   AM-PAC Basic Mobility Inpatient   Turning in Flat Bed Without Bedrails 3   Lying on Back to Sitting on Edge of Flat Bed Without Bedrails 3   Moving Bed to Chair 3   Standing Up From Chair Using Arms 3   Walk in Room 3   Climb 3-5 Stairs With Railing 3   Basic Mobility Inpatient Raw Score 18   Basic Mobility Standardized Score 41.05   [facility] Highest Level Of Mobility   -HLM Goal 6: Walk 10 steps or more   -HLM Achieved 7: Walk 25 feet or more   Modified [place] Scale   Modified [place] Scale 1   End of Consult   Patient Position at End of Consult All needs within reach;Bed/Chair alarm activated;Bedside chair           Lanny Beltran, PT DPT

## 2024-06-06 ENCOUNTER — APPOINTMENT (EMERGENCY)
Dept: RADIOLOGY | Facility: HOSPITAL | Age: 54
End: 2024-06-06
Payer: COMMERCIAL

## 2024-06-06 ENCOUNTER — HOSPITAL ENCOUNTER (OUTPATIENT)
Facility: HOSPITAL | Age: 54
Setting detail: OBSERVATION
Discharge: HOME | End: 2024-06-07
Attending: EMERGENCY MEDICINE | Admitting: INTERNAL MEDICINE
Payer: COMMERCIAL

## 2024-06-06 DIAGNOSIS — N13.30 HYDRONEPHROSIS OF RIGHT KIDNEY: ICD-10-CM

## 2024-06-06 DIAGNOSIS — R10.9 ACUTE RIGHT FLANK PAIN: Primary | ICD-10-CM

## 2024-06-06 DIAGNOSIS — N13.4 HYDROURETER ON RIGHT: ICD-10-CM

## 2024-06-06 PROBLEM — N50.811 TESTICULAR PAIN, RIGHT: Status: ACTIVE | Noted: 2024-06-06

## 2024-06-06 PROBLEM — R31.0 GROSS HEMATURIA: Status: ACTIVE | Noted: 2024-06-06

## 2024-06-06 LAB
ALBUMIN SERPL BCP-MCNC: 4.3 G/DL (ref 3.5–5)
ALP SERPL-CCNC: 50 U/L (ref 34–104)
ALT SERPL W P-5'-P-CCNC: 44 U/L (ref 7–52)
ANION GAP SERPL CALCULATED.3IONS-SCNC: 8 MMOL/L (ref 4–13)
AST SERPL W P-5'-P-CCNC: 25 U/L (ref 13–39)
BACTERIA UR QL AUTO: ABNORMAL /HPF
BASOPHILS # BLD AUTO: 0.06 THOUSANDS/ÂΜL (ref 0–0.1)
BASOPHILS NFR BLD AUTO: 1 % (ref 0–1)
BILIRUB DIRECT SERPL-MCNC: 0.15 MG/DL (ref 0–0.2)
BILIRUB SERPL-MCNC: 0.93 MG/DL (ref 0.2–1)
BILIRUB UR QL STRIP: NEGATIVE
BUDDING YEAST: PRESENT
BUN SERPL-MCNC: 32 MG/DL (ref 5–25)
CALCIUM SERPL-MCNC: 9.5 MG/DL (ref 8.4–10.2)
CHLORIDE SERPL-SCNC: 103 MMOL/L (ref 96–108)
CLARITY UR: CLEAR
CO2 SERPL-SCNC: 26 MMOL/L (ref 21–32)
COLOR UR: ABNORMAL
CREAT SERPL-MCNC: 1.27 MG/DL (ref 0.6–1.3)
EOSINOPHIL # BLD AUTO: 0.24 THOUSAND/ÂΜL (ref 0–0.61)
EOSINOPHIL NFR BLD AUTO: 3 % (ref 0–6)
ERYTHROCYTE [DISTWIDTH] IN BLOOD BY AUTOMATED COUNT: 12.1 % (ref 11.6–15.1)
GFR SERPL CREATININE-BSD FRML MDRD: 63 ML/MIN/1.73SQ M
GLUCOSE SERPL-MCNC: 104 MG/DL (ref 65–140)
GLUCOSE UR STRIP-MCNC: NEGATIVE MG/DL
GRAN CASTS #/AREA URNS LPF: ABNORMAL /[LPF]
HCT VFR BLD AUTO: 48.3 % (ref 36.5–49.3)
HGB BLD-MCNC: 16.9 G/DL (ref 12–17)
HGB UR QL STRIP.AUTO: ABNORMAL
IMM GRANULOCYTES # BLD AUTO: 0.03 THOUSAND/UL (ref 0–0.2)
IMM GRANULOCYTES NFR BLD AUTO: 0 % (ref 0–2)
KETONES UR STRIP-MCNC: NEGATIVE MG/DL
LEUKOCYTE ESTERASE UR QL STRIP: ABNORMAL
LIPASE SERPL-CCNC: 32 U/L (ref 11–82)
LYMPHOCYTES # BLD AUTO: 1.12 THOUSANDS/ÂΜL (ref 0.6–4.47)
LYMPHOCYTES NFR BLD AUTO: 14 % (ref 14–44)
MCH RBC QN AUTO: 31.2 PG (ref 26.8–34.3)
MCHC RBC AUTO-ENTMCNC: 35 G/DL (ref 31.4–37.4)
MCV RBC AUTO: 89 FL (ref 82–98)
MONOCYTES # BLD AUTO: 0.53 THOUSAND/ÂΜL (ref 0.17–1.22)
MONOCYTES NFR BLD AUTO: 7 % (ref 4–12)
NEUTROPHILS # BLD AUTO: 5.86 THOUSANDS/ÂΜL (ref 1.85–7.62)
NEUTS SEG NFR BLD AUTO: 75 % (ref 43–75)
NITRITE UR QL STRIP: NEGATIVE
NON-SQ EPI CELLS URNS QL MICRO: ABNORMAL /HPF
NRBC BLD AUTO-RTO: 0 /100 WBCS
PH UR STRIP.AUTO: 5.5 [PH]
PLATELET # BLD AUTO: 238 THOUSANDS/UL (ref 149–390)
PMV BLD AUTO: 10.8 FL (ref 8.9–12.7)
POTASSIUM SERPL-SCNC: 3.6 MMOL/L (ref 3.5–5.3)
PROT SERPL-MCNC: 6.9 G/DL (ref 6.4–8.4)
PROT UR STRIP-MCNC: ABNORMAL MG/DL
RBC # BLD AUTO: 5.41 MILLION/UL (ref 3.88–5.62)
RBC #/AREA URNS AUTO: ABNORMAL /HPF
SODIUM SERPL-SCNC: 137 MMOL/L (ref 135–147)
SP GR UR STRIP.AUTO: 1.01 (ref 1–1.03)
UROBILINOGEN UR STRIP-ACNC: <2 MG/DL
WBC # BLD AUTO: 7.84 THOUSAND/UL (ref 4.31–10.16)
WBC #/AREA URNS AUTO: ABNORMAL /HPF

## 2024-06-06 PROCEDURE — 99223 1ST HOSP IP/OBS HIGH 75: CPT | Performed by: UROLOGY

## 2024-06-06 PROCEDURE — 80048 BASIC METABOLIC PNL TOTAL CA: CPT | Performed by: EMERGENCY MEDICINE

## 2024-06-06 PROCEDURE — 83690 ASSAY OF LIPASE: CPT | Performed by: EMERGENCY MEDICINE

## 2024-06-06 PROCEDURE — 81001 URINALYSIS AUTO W/SCOPE: CPT | Performed by: EMERGENCY MEDICINE

## 2024-06-06 PROCEDURE — 85025 COMPLETE CBC W/AUTO DIFF WBC: CPT | Performed by: EMERGENCY MEDICINE

## 2024-06-06 PROCEDURE — 99285 EMERGENCY DEPT VISIT HI MDM: CPT | Performed by: EMERGENCY MEDICINE

## 2024-06-06 PROCEDURE — 99222 1ST HOSP IP/OBS MODERATE 55: CPT | Performed by: INTERNAL MEDICINE

## 2024-06-06 PROCEDURE — 96376 TX/PRO/DX INJ SAME DRUG ADON: CPT

## 2024-06-06 PROCEDURE — 74177 CT ABD & PELVIS W/CONTRAST: CPT

## 2024-06-06 PROCEDURE — 87086 URINE CULTURE/COLONY COUNT: CPT | Performed by: EMERGENCY MEDICINE

## 2024-06-06 PROCEDURE — 80076 HEPATIC FUNCTION PANEL: CPT | Performed by: EMERGENCY MEDICINE

## 2024-06-06 PROCEDURE — 96374 THER/PROPH/DIAG INJ IV PUSH: CPT

## 2024-06-06 PROCEDURE — 36415 COLL VENOUS BLD VENIPUNCTURE: CPT | Performed by: EMERGENCY MEDICINE

## 2024-06-06 PROCEDURE — 99284 EMERGENCY DEPT VISIT MOD MDM: CPT

## 2024-06-06 PROCEDURE — NC001 PR NO CHARGE: Performed by: INTERNAL MEDICINE

## 2024-06-06 PROCEDURE — 76870 US EXAM SCROTUM: CPT

## 2024-06-06 RX ORDER — SODIUM CHLORIDE, SODIUM GLUCONATE, SODIUM ACETATE, POTASSIUM CHLORIDE, MAGNESIUM CHLORIDE, SODIUM PHOSPHATE, DIBASIC, AND POTASSIUM PHOSPHATE .53; .5; .37; .037; .03; .012; .00082 G/100ML; G/100ML; G/100ML; G/100ML; G/100ML; G/100ML; G/100ML
100 INJECTION, SOLUTION INTRAVENOUS CONTINUOUS
Status: DISCONTINUED | OUTPATIENT
Start: 2024-06-06 | End: 2024-06-06

## 2024-06-06 RX ORDER — HYDROMORPHONE HCL/PF 1 MG/ML
0.5 SYRINGE (ML) INJECTION EVERY 4 HOURS PRN
Status: DISCONTINUED | OUTPATIENT
Start: 2024-06-06 | End: 2024-06-07 | Stop reason: HOSPADM

## 2024-06-06 RX ORDER — TAMSULOSIN HYDROCHLORIDE 0.4 MG/1
0.4 CAPSULE ORAL
Status: DISCONTINUED | OUTPATIENT
Start: 2024-06-06 | End: 2024-06-07 | Stop reason: HOSPADM

## 2024-06-06 RX ORDER — POLYETHYLENE GLYCOL 3350 17 G/17G
17 POWDER, FOR SOLUTION ORAL DAILY PRN
Status: DISCONTINUED | OUTPATIENT
Start: 2024-06-06 | End: 2024-06-07 | Stop reason: HOSPADM

## 2024-06-06 RX ORDER — ACETAMINOPHEN 325 MG/1
650 TABLET ORAL EVERY 6 HOURS PRN
Status: DISCONTINUED | OUTPATIENT
Start: 2024-06-06 | End: 2024-06-07 | Stop reason: HOSPADM

## 2024-06-06 RX ORDER — OXYBUTYNIN CHLORIDE 5 MG/1
5 TABLET ORAL 3 TIMES DAILY
Status: DISCONTINUED | OUTPATIENT
Start: 2024-06-06 | End: 2024-06-07 | Stop reason: HOSPADM

## 2024-06-06 RX ORDER — HYDROMORPHONE HCL/PF 1 MG/ML
0.5 SYRINGE (ML) INJECTION ONCE
Status: DISCONTINUED | OUTPATIENT
Start: 2024-06-06 | End: 2024-06-06

## 2024-06-06 RX ORDER — PHENAZOPYRIDINE HYDROCHLORIDE 100 MG/1
100 TABLET, FILM COATED ORAL
Status: DISCONTINUED | OUTPATIENT
Start: 2024-06-06 | End: 2024-06-07 | Stop reason: HOSPADM

## 2024-06-06 RX ORDER — KETOROLAC TROMETHAMINE 10 MG/1
10 TABLET, FILM COATED ORAL ONCE
Status: COMPLETED | OUTPATIENT
Start: 2024-06-06 | End: 2024-06-06

## 2024-06-06 RX ORDER — ONDANSETRON 4 MG/1
4 TABLET, ORALLY DISINTEGRATING ORAL EVERY 6 HOURS PRN
Status: DISCONTINUED | OUTPATIENT
Start: 2024-06-06 | End: 2024-06-07 | Stop reason: HOSPADM

## 2024-06-06 RX ORDER — HYDROMORPHONE HCL/PF 1 MG/ML
0.5 SYRINGE (ML) INJECTION ONCE
Status: COMPLETED | OUTPATIENT
Start: 2024-06-06 | End: 2024-06-06

## 2024-06-06 RX ORDER — CEPHALEXIN 500 MG/1
500 CAPSULE ORAL EVERY 6 HOURS SCHEDULED
Status: COMPLETED | OUTPATIENT
Start: 2024-06-06 | End: 2024-06-07

## 2024-06-06 RX ORDER — PHENAZOPYRIDINE HYDROCHLORIDE 100 MG/1
100 TABLET, FILM COATED ORAL 3 TIMES DAILY PRN
Status: DISCONTINUED | OUTPATIENT
Start: 2024-06-06 | End: 2024-06-06

## 2024-06-06 RX ORDER — OXYCODONE HYDROCHLORIDE 5 MG/1
5 TABLET ORAL EVERY 4 HOURS PRN
Status: DISCONTINUED | OUTPATIENT
Start: 2024-06-06 | End: 2024-06-07 | Stop reason: HOSPADM

## 2024-06-06 RX ORDER — HYDROMORPHONE HCL/PF 1 MG/ML
0.5 SYRINGE (ML) INJECTION
Status: DISCONTINUED | OUTPATIENT
Start: 2024-06-06 | End: 2024-06-06

## 2024-06-06 RX ORDER — OXYBUTYNIN CHLORIDE 5 MG/1
5 TABLET ORAL 2 TIMES DAILY
Status: DISCONTINUED | OUTPATIENT
Start: 2024-06-06 | End: 2024-06-06

## 2024-06-06 RX ORDER — SODIUM CHLORIDE, SODIUM GLUCONATE, SODIUM ACETATE, POTASSIUM CHLORIDE, MAGNESIUM CHLORIDE, SODIUM PHOSPHATE, DIBASIC, AND POTASSIUM PHOSPHATE .53; .5; .37; .037; .03; .012; .00082 G/100ML; G/100ML; G/100ML; G/100ML; G/100ML; G/100ML; G/100ML
125 INJECTION, SOLUTION INTRAVENOUS CONTINUOUS
Status: DISCONTINUED | OUTPATIENT
Start: 2024-06-06 | End: 2024-06-07 | Stop reason: HOSPADM

## 2024-06-06 RX ORDER — TADALAFIL 5 MG/1
5 TABLET ORAL DAILY
Status: DISCONTINUED | OUTPATIENT
Start: 2024-06-06 | End: 2024-06-07 | Stop reason: HOSPADM

## 2024-06-06 RX ADMIN — ACETAMINOPHEN 650 MG: 325 TABLET, FILM COATED ORAL at 20:44

## 2024-06-06 RX ADMIN — HYDROMORPHONE HYDROCHLORIDE 0.5 MG: 1 INJECTION, SOLUTION INTRAMUSCULAR; INTRAVENOUS; SUBCUTANEOUS at 19:39

## 2024-06-06 RX ADMIN — CEPHALEXIN 500 MG: 500 CAPSULE ORAL at 22:01

## 2024-06-06 RX ADMIN — CEPHALEXIN 500 MG: 500 CAPSULE ORAL at 17:13

## 2024-06-06 RX ADMIN — OXYBUTYNIN CHLORIDE 5 MG: 5 TABLET ORAL at 20:44

## 2024-06-06 RX ADMIN — OXYCODONE HYDROCHLORIDE 5 MG: 5 TABLET ORAL at 15:53

## 2024-06-06 RX ADMIN — HYDROMORPHONE HYDROCHLORIDE 0.5 MG: 1 INJECTION, SOLUTION INTRAMUSCULAR; INTRAVENOUS; SUBCUTANEOUS at 14:19

## 2024-06-06 RX ADMIN — KETOROLAC TROMETHAMINE 10 MG: 10 TABLET, FILM COATED ORAL at 22:01

## 2024-06-06 RX ADMIN — OXYCODONE HYDROCHLORIDE 5 MG: 5 TABLET ORAL at 20:44

## 2024-06-06 RX ADMIN — HYDROMORPHONE HYDROCHLORIDE 0.5 MG: 1 INJECTION, SOLUTION INTRAMUSCULAR; INTRAVENOUS; SUBCUTANEOUS at 09:42

## 2024-06-06 RX ADMIN — SODIUM CHLORIDE, SODIUM GLUCONATE, SODIUM ACETATE, POTASSIUM CHLORIDE, MAGNESIUM CHLORIDE, SODIUM PHOSPHATE, DIBASIC, AND POTASSIUM PHOSPHATE 125 ML/HR: .53; .5; .37; .037; .03; .012; .00082 INJECTION, SOLUTION INTRAVENOUS at 15:53

## 2024-06-06 RX ADMIN — TAMSULOSIN HYDROCHLORIDE 0.4 MG: 0.4 CAPSULE ORAL at 15:52

## 2024-06-06 RX ADMIN — PHENAZOPYRIDINE 100 MG: 100 TABLET ORAL at 17:13

## 2024-06-06 RX ADMIN — IOHEXOL 100 ML: 350 INJECTION, SOLUTION INTRAVENOUS at 12:49

## 2024-06-06 RX ADMIN — HYDROMORPHONE HYDROCHLORIDE 0.5 MG: 1 INJECTION, SOLUTION INTRAMUSCULAR; INTRAVENOUS; SUBCUTANEOUS at 13:16

## 2024-06-06 RX ADMIN — OXYBUTYNIN CHLORIDE 5 MG: 5 TABLET ORAL at 17:14

## 2024-06-06 NOTE — ASSESSMENT & PLAN NOTE
"Patient has history of polycythemia of unclear etiology.  Per chart review, patient was previously living in [facility] and had been told he has \"thick blood\", had been taking 1 baby aspirin daily.  Was partially attributed to him living [place] higher altitude [place]approximately [place]), and hemoglobin has trended downward slightly over the last several months since moving here.     Plan  Holding asa in setting of gross hematuria  "

## 2024-06-06 NOTE — H&P
INTERNAL MEDICINE RESIDENCY ADMISSION H&P     Name: Hiral Cullen   Age & Sex: 53 y.o. male   MRN: [id number]  Unit/Bed#: CW2 214-01   Encounter: [id number]  Primary Care Provider: Kris Shell MD    Code Status: Level 1 - Full Code  Admission Status: OBSERVATION  Disposition: Patient requires Med/Surg    Admit to team: SOD Team A    ASSESSMENT/PLAN     Principal Problem:    Nephrolithiasis  Active Problems:    Polycythemia    Testicular pain, right      * Nephrolithiasis  Assessment & Plan  Patient has a history of nephrolithiasis s/p stenting in the past with recurrent UTIs. CT from 4/28/2024 was notable for an obstructing left 6 mm UVJ stone, and a nonobstructing right renal lower pole 5 mm stone. More recently, patient admitted from 6/1-6/3 for mild right-sided hydroureteronephrosis secondary to a 4 mm calculus in the proximal right ureter. Underwent cystoscopy with right ureteroscopy, holmium laser lithotripsy, right retrograde pyelography and right ureteral stent insertion. Self-removed stent as instructed on 1 day PTA. CT during this admission demonstrating persistent R hydro and hydroureter with delayed nephrogram. Evidence of blood products and calculus in distal R ureter contributing to ureteral obstruction. Moderate fat stranding also seen. Plan for urologic consult, pain control, hydration.      Most likely ureter stent colic, and inflammation with some residual stone passage. Likely some local trauma associated with stent removal, with pain involving testicular area, radiating to R groin. Afebrile, no white count, and UA without bacteria/nitrite. Leukocytes in UA most likely reactive. Thus, low suspicion for infection. Will complete abx course with cephalexin, per urology.       Plan:  Urology consult  Per urology, no plans for OR; continue with sx management and a three-way Miranda catheter with manual irrigation until clear   Regular diet  Continue home tadalafil and tamsulosin  Stent  "colic pain control with pyridium and oxybutynin  Tylenol, oxycodone 2.5/5 and breakthrough Dilaudid ordered for pain control  Zofran as needed for nausea  IV fluid hydration with isolyte 125 cc/h  Consider bladder US with PVR if concern for bladder obstruction  Cw 3 day course of Keflex which should end today  If showing signs of infection, consider broadening to CTX    Testicular pain, right  Assessment & Plan  Endorsing some testicular tenderness and swelling R > L. No inguinal hernia palpated.     Testicle ultrasound: 1. No testicular torsion. 2. Small bilateral hydroceles, complex on the right and epididymal cysts. Likely some local trauma associated with stent removal, with pain involving testicular area, radiating to R groin.     Plan  Pain control    Polycythemia  Assessment & Plan  Patient has history of polycythemia of unclear etiology.  Per chart review, patient was previously living in [facility] and had been told he has \"thick blood\", had been taking 1 baby aspirin daily.  Was partially attributed to him living at higher altitude (approximately 4000 feet), and hemoglobin has trended downward slightly over the last several months since moving here.     Plan  Holding asa in setting of gross hematuria        VTE Pharmacologic Prophylaxis: Reason for no pharmacologic prophylaxis low risk  VTE Mechanical Prophylaxis: reason for no mechanical VTE prophylaxis ambulatory    CHIEF COMPLAINT     Chief Complaint   Patient presents with    Groin Pain     Patient arrives with right sided groin pain. He was seen on Saturday for kidney stones. Stones removed on Sunday. Increasing pain today. Patient was discharged on Tuesday.       HISTORY OF PRESENT ILLNESS     Pt is a 52 Yo M with PMH of erythrocytosis, bilateral nonobstructive nephrolithiasis, BPH, chronic back pain presenting to the hospital for acute on chronic R flank pain. Patient has a history of nephrolithiasis s/p stenting in the past with recurrent UTIs. CT " from 4/28/2024 was notable for an obstructing left 6 mm UVJ stone, and a nonobstructing right renal lower pole 5 mm stone. More recently, patient was admitted from 6/1-6/3 for mild right-sided hydroureteronephrosis secondary to a 4 mm calculus in the proximal right ureter. He underwent right ureteroscopy, holmium laser lithotripsy, right retrograde pyelography and right ureteral stent insertion. Fluoroscopic guidance for removal of a right ureteral stone and stent placement. Upon discharge, patient was instructed to take norco for pain, and 3-day course of cephalexin. Afterwards, his pain was relatively controlled with norco, but 2 hours after he pulled the stent out (as instructed), he experience exquisite R flank pain and R testicular pain, radiating the R groin.      In the ED, afebrile and slightly hypertensive, not tachycardic.  On exam, tender over the right flank as well as R testicular pain, radiating to R groin.  Chemistry notable for creatinine of 1.27, which is near his baseline. CBC unremarkable. UA showing large blood; 10-20 WBC, 0-3 granular casts, budding yeast. No bacteruria or nitrites. No growth from urine culture taken 6/2. Repeat urine culture pending. CT AP demonstrating persistent R hydro and hydroureter with delayed nephrogram. Evidence of blood products and calculus in distal R ureter contributing to ureteral obstruction. Moderate fat stranding also seen. Patient to be admitted to [place] for pain control and urologic consultation.    Per examination of patient, pt states he's having ongoing uncontrolled pain, chills, dysuria, and gross hematuria. He feels sensation of incomplete bladder emptying. Able to make urine. States he passed a few small stones since the stent placement. He did not collect them. Otherwise, no nausea, vomiting, fever, CP, SOB. He has ongoing chronic back pain. No red flag LE symptoms.       REVIEW OF SYSTEMS     Review of Systems   All other systems reviewed and are  negative.    OBJECTIVE     Vitals:    24 0909   BP: (!) 154/102   BP Location: Left arm   Pulse: 72   Resp: 20   Temp: 97.6 °F (36.4 °C)   TempSrc: Temporal   SpO2: 99%      Temperature:   Temp (24hrs), Av.6 °F (36.4 °C), Min:97.6 °F (36.4 °C), Max:97.6 °F (36.4 °C)    Temperature: 97.6 °F (36.4 °C)  Intake & Output:  I/O       None          Weights:        There is no height or weight on file to calculate BMI.  Weight (last 2 days)       None          Physical Exam  Constitutional:       General: He is not in acute distress.     Appearance: He is normal weight. He is ill-appearing. He is not toxic-appearing.   HENT:      Mouth/Throat:      Mouth: Mucous membranes are moist.      Pharynx: Oropharynx is clear.   Eyes:      Pupils: Pupils are equal, round, and reactive to light.   Cardiovascular:      Rate and Rhythm: Normal rate and regular rhythm.      Pulses: Normal pulses.      Heart sounds: Normal heart sounds.   Pulmonary:      Effort: Pulmonary effort is normal.      Breath sounds: Normal breath sounds.   Abdominal:      General: Bowel sounds are normal. There is no distension.      Palpations: There is no mass.      Tenderness: There is abdominal tenderness (R groin and R flank). There is right CVA tenderness (R > L) and left CVA tenderness.      Hernia: No hernia is present.   Genitourinary:     Penis: Normal.       Comments: R testicular tenderness, mild swelling R > L; no palpable mass  Musculoskeletal:         General: No swelling. Normal range of motion.   Skin:     General: Skin is warm.      Capillary Refill: Capillary refill takes less than 2 seconds.   Neurological:      General: No focal deficit present.      Mental Status: He is alert and oriented to person, place, and time.   Psychiatric:         Mood and Affect: Mood normal.         Behavior: Behavior normal.         Thought Content: Thought content normal.         Judgment: Judgment normal.       PAST MEDICAL HISTORY   No past medical  history on file.  PAST SURGICAL HISTORY     Past Surgical History:   Procedure Laterality Date    BLADDER STONE REMOVAL      FL RETROGRADE PYELOGRAM  6/2/2024    UT CYSTO/URETERO W/LITHOTRIPSY &INDWELL STENT INSRT Right 6/2/2024    Procedure: CYSTOSCOPY URETEROSCOPY WITH LITHOTRIPSY HOLMIUM LASER, RETROGRADE PYELOGRAM AND INSERTION STENT URETERAL;  Surgeon: Javi Bishop MD;  Location: [facility];  Service: Urology     SOCIAL & FAMILY HISTORY     Social History     Substance and Sexual Activity   Alcohol Use Not Currently       Social History     Substance and Sexual Activity   Drug Use Never     Social History     Tobacco Use   Smoking Status Never   Smokeless Tobacco Never     No family history on file.  LABORATORY DATA     Labs: I have personally reviewed pertinent reports.    Results from last 7 days   Lab Units 06/06/24  0942 06/03/24  0537 06/02/24  0428   WBC Thousand/uL 7.84 8.27 8.53   HEMOGLOBIN g/dL 16.9 14.8 15.7   HEMATOCRIT % 48.3 43.3 45.3   PLATELETS Thousands/uL 238 170 178   SEGS PCT % 75 75 79*   MONO PCT % 7 9 8   EOS PCT % 3 1 0      Results from last 7 days   Lab Units 06/06/24  0942 06/03/24  0537 06/02/24  0428 06/01/24  2307   POTASSIUM mmol/L 3.6 3.7 4.1 3.8   CHLORIDE mmol/L 103 108 104 104   CO2 mmol/L 26 23 26 25   BUN mg/dL 32* 22 26* 25   CREATININE mg/dL 1.27 1.12 1.72* 1.53*   CALCIUM mg/dL 9.5 8.0* 8.3* 9.1   ALK PHOS U/L 50  --   --  53   ALT U/L 44  --   --  34   AST U/L 25  --   --  25                          Micro:  Lab Results   Component Value Date    URINECX No Growth <1000 cfu/mL 06/02/2024     IMAGING & DIAGNOSTIC TESTS     Imaging: I have personally reviewed pertinent reports.    CT abdomen pelvis w contrast    Result Date: 6/6/2024  Impression: 1. Persistent right hydronephrosis and hydroureter with delayed nephrogram. There is a punctate calcification at the right UVJ as well as high density material in the urinary bladder most likely blood products and a  punctate calculus in the distal right ureter contributing to a moderate degree of ureteral obstruction. This finding can be followed sonographically to ensure resolution. 2. Moderate fat stranding in the right retroperitoneum in association with the right ureter and ureteral enhancement which could be related to recent procedure and/or developing infection/inflammation. The study was marked in EPIC for immediate notification. Workstation performed: [id number]     US scrotum and testicles    Result Date: 6/6/2024  Impression: 1. No testicular torsion. 2. Small bilateral hydroceles, complex on the right and epididymal cysts. Workstation performed: [id number]     EKG, Pathology, and Other Studies: I have personally reviewed pertinent reports.     ALLERGIES   No Known Allergies  MEDICATIONS PRIOR TO ARRIVAL     Prior to Admission medications    Medication Sig Start Date End Date Taking? Authorizing Provider   aspirin (ECOTRIN LOW STRENGTH) 81 mg EC tablet Take 81 mg by mouth daily    Historical Provider, MD   cephalexin (KEFLEX) 500 mg capsule Take 1 capsule (500 mg total) by mouth every 6 (six) hours for 3 days 6/3/24 6/6/24  Nguyễn Hayden MD   cholecalciferol (VITAMIN D3) 1,000 units tablet Take 1 tablet (1,000 Units total) by mouth daily 3/28/24 7/26/24  Kris Shell MD   Diclofenac Sodium (VOLTAREN) 1 % Apply 2 g topically 4 (four) times a day For lower back pain. 11/22/23   Tommy Castrjeon MD   gabapentin (Neurontin) 100 mg capsule Take 3 capsules (300 mg total) by mouth daily at bedtime AND 1 capsule (100 mg total) every morning. 5/30/24 9/27/24  Kris Shell MD   HYDROcodone-acetaminophen (Norco) 5-325 mg per tablet Take 1 tablet by mouth every 6 (six) hours as needed for pain for up to 5 days Max Daily Amount: 4 tablets 6/3/24 6/8/24  Osorio Maldonado MD   phenazopyridine (PYRIDIUM) 100 mg tablet Take 1 tablet (100 mg total) by mouth 3 (three) times a day as needed for bladder spasms 6/3/24   Nguyễn  MD Jackelyn   tadalafil (CIALIS) 5 MG tablet Take 1 tablet (5 mg total) by mouth in the morning 5/30/24 6/29/24  Kris Shell MD   tamsulosin (FLOMAX) 0.4 mg Take 1 capsule (0.4 mg total) by mouth daily with dinner 5/30/24   Kris Shell MD     MEDICATIONS ADMINISTERED IN LAST 24 HOURS     Medication Administration - last 24 hours from 06/05/2024 1531 to 06/06/2024 1531         Date/Time Order Dose Route Action Action by     06/06/2024 0942 EDT HYDROmorphone (DILAUDID) injection 0.5 mg 0.5 mg Intravenous Given Irma Eden RN     06/06/2024 1419 EDT HYDROmorphone (DILAUDID) injection 0.5 mg 0.5 mg Intravenous Given Irma Eden RN     06/06/2024 1316 EDT HYDROmorphone (DILAUDID) injection 0.5 mg 0.5 mg Intravenous Given Venu Petersen RN     06/06/2024 1249 EDT iohexol (OMNIPAQUE) 350 MG/ML injection (MULTI-DOSE) 100 mL 100 mL Intravenous Given Carmen Harding          CURRENT MEDICATIONS     Current Facility-Administered Medications   Medication Dose Route Frequency Provider Last Rate    acetaminophen  650 mg Oral Q6H PRN Kris Shell MD      cephalexin  500 mg Oral Q6H CHINO Kris Shell MD      HYDROmorphone  0.5 mg Intravenous Q4H PRN Kris Shell MD      multi-electrolyte  125 mL/hr Intravenous Continuous Kris Shell MD      naloxone  0.04 mg Intravenous Q1MIN PRN Kris Shell MD      ondansetron  4 mg Oral Q6H PRN Kris Shell MD      oxybutynin  5 mg Oral BID Kris Shell MD      oxyCODONE  2.5 mg Oral Q4H PRN Kris Shell MD      Or    oxyCODONE  5 mg Oral Q4H PRN Kris Shell MD      phenazopyridine  100 mg Oral TID With Meals Kris Shell MD      polyethylene glycol  17 g Oral Daily PRN Kris Shell MD      tadalafil  5 mg Oral Daily Kris Shell MD      tamsulosin  0.4 mg Oral Daily With Dinner Kris Shell MD       multi-electrolyte, 125 mL/hr      acetaminophen, 650 mg, Q6H PRN  HYDROmorphone, 0.5 mg, Q4H PRN  naloxone, 0.04 mg, Q1MIN PRN  ondansetron, 4 mg, Q6H PRN  oxyCODONE, 2.5 mg, Q4H PRN    "Or  oxyCODONE, 5 mg, Q4H PRN  polyethylene glycol, 17 g, Daily PRN        Admission Time  I spent 45 minutes admitting the patient.  This involved direct patient contact where I performed a full history and physical, reviewing previous records, and reviewing laboratory and other diagnostic studies.    Portions of the record may have been created with voice recognition software.  Occasional wrong word or \"sound a like\" substitutions may have occurred due to the inherent limitations of voice recognition software.  Read the chart carefully and recognize, using context, where substitutions have occurred.    ==  Kris Shell MD  [facility]  Internal Medicine Residency PGY-1   "

## 2024-06-06 NOTE — CONSULTS
Consult - Urology   Hiral JESUS Cullen 1970, 53 y.o. male MRN: [id number]    Unit/Bed#: [facility] 214-01 Encounter: [id number]    Gross hematuria  Assessment & Plan  Patient reports passing clots  CT with high density material urinary bladder most likely blood products   Placed three-way Miranda catheter  Manually irrigate until clear  Begin CBI  Trend labs      Testicular pain, right  Assessment & Plan  Ultrasound scrotum and testicles small bilateral hydroceles (complex on the right) and bilateral epididymal cysts  Scrotal support  NSAIDs for discomfort    * Nephrolithiasis  Assessment & Plan  CT: Persistent right hydronephrosis and hydroureter with delayed nephrogram.  Punctate calcification right UVJ as well as high density material in urinary bladder most likely blood products and a punctate calculus in the distal right ureter contributing to moderate degree of ureteral obstruction  WBC 7.84  Hgb 16.9  Creat 1.27  Pain management medical optimization per primary team  Fluids per primary team  Oxybutynin, Pyridium, Flomax for residual colic status post stent removal  Okay to have diet  No plans for surgical intervention at this time  Continue to trend labs          Subjective:   53-year-old Micronesian-speaking male whose daughter translated today's visit.  Patient is status post cystoscopy, ureteroscopy, laser lithotripsy, retrograde pyelogram and stent insertion on 6/2/2024.  Patient removed his stent as per protocol on 6/5/2024.  Patient has been having significant pain in the right flank area since removal of his stent.  He reports intermittent hematuria expected in the setting of recent instrumentation and stent removal.  Denies any fevers or chills.  He reports prior stone surgery in [place] and did not have this kind of pain.  He reports the pain is in the right flank area, umbilical area and into the testicles.  Ultrasound of scrotum and testicles did not reveal any torsion but has bilateral hydroceles  and epididymal cyst    Review of Systems   Constitutional:  Negative for activity change, appetite change, chills, fatigue, fever and unexpected weight change.   HENT:  Negative for facial swelling.    Eyes:  Negative for discharge.   Respiratory: Negative.  Negative for cough and shortness of breath.    Cardiovascular:  Negative for chest pain and leg swelling.   Gastrointestinal:  Positive for abdominal pain. Negative for abdominal distention, constipation, diarrhea, nausea and vomiting.   Endocrine: Negative.    Genitourinary:  Positive for flank pain, hematuria and testicular pain. Negative for decreased urine volume, difficulty urinating, dysuria, enuresis, frequency, genital sores and urgency.   Musculoskeletal:  Negative for back pain and myalgias.   Skin:  Negative for pallor and rash.   Allergic/Immunologic: Negative.  Negative for immunocompromised state.   Neurological:  Negative for facial asymmetry and speech difficulty.   Psychiatric/Behavioral:  Negative for agitation and confusion.        Objective:  Vitals: Blood pressure 114/72, pulse 60, temperature 98.1 °F (36.7 °C), resp. rate 20, SpO2 97%.,There is no height or weight on file to calculate BMI.    Physical Exam  Vitals and nursing note reviewed.   Constitutional:       General: He is in acute distress.      Appearance: Normal appearance. He is not ill-appearing, toxic-appearing or diaphoretic.   HENT:      Head: Normocephalic.   Cardiovascular:      Rate and Rhythm: Normal rate.   Pulmonary:      Effort: Pulmonary effort is normal. No respiratory distress.   Abdominal:      General: Abdomen is flat. There is no distension.      Palpations: Abdomen is soft.      Tenderness: There is abdominal tenderness. There is no guarding or rebound.      Comments: Mild right-sided and lower abdominal tenderness with palpation   Genitourinary:     Penis: No hypospadias, erythema, tenderness, discharge, swelling or lesions.       Testes:         Right:  Tenderness present. Mass or swelling not present. Right testis is descended.         Left: Mass, tenderness or swelling not present. Left testis is descended.   Musculoskeletal:         General: No swelling.   Skin:     General: Skin is warm and dry.   Neurological:      General: No focal deficit present.      Mental Status: He is alert and oriented to person, place, and time.   Psychiatric:         Mood and Affect: Mood normal.         Behavior: Behavior normal.         Thought Content: Thought content normal.         Judgment: Judgment normal.         Imaging:  CT ABDOMEN AND PELVIS WITH IV CONTRAST     INDICATION: . Right-sided groin pain, renal calculi, right hydronephrosis with removal of calculi     COMPARISON: CT abdomen and pelvis June 2, 2024, scrotal ultrasound June 6, 2024     TECHNIQUE: CT examination of the abdomen and pelvis was performed. Multiplanar 2D reformatted images were created from the source data.     This examination, like all CT scans performed in the [facility], was performed utilizing techniques to minimize radiation dose exposure, including the use of iterative reconstruction and automated exposure control. Radiation dose length   product (DLP) for this visit: 608.66 mGy-cm     IV Contrast: 100 mL of iohexol (OMNIPAQUE)  Enteric Contrast: Not administered.     FINDINGS:     ABDOMEN     LOWER CHEST: Lingular subsegmental atelectasis.        LIVER/BILIARY TREE: Unremarkable.     GALLBLADDER: Post cholecystectomy.     SPLEEN: Unremarkable.     PANCREAS: Unremarkable.     ADRENAL GLANDS: Unremarkable.     KIDNEYS/URETERS: Right hydronephrosis is seen with delayed right nephrogram. Perirenal stranding has mostly resolved.     Dilatation of the right ureter is seen with moderate stranding of the periureteral fat series 2/92.     Previously seen calculus no longer visualized at the level of the mid ureter.  Dilatation of the right ureter extends to the level of the right  ureteral vesicle junction.  There is punctate calcification at this level, series 2/155 as well as soft tissue thickening and high density material in the urinary bladder which most likely represents blood products.     An additional punctate calcification is seen in the distal right ureter 2/149.  This is probably a residual stone fragment in the distal ureter.  There is mild hyperemia of the right ureter which could be related to recent procedure and or development of associated infection.     STOMACH AND BOWEL: Unremarkable.     APPENDIX: Normal.     ABDOMINOPELVIC CAVITY: No ascites. No pneumoperitoneum. No lymphadenopathy.     VESSELS: Atherosclerosis without abdominal aortic aneurysm.     PELVIS     REPRODUCTIVE ORGANS: Unremarkable for patient's age.     URINARY BLADDER: Punctate calcification at the right UVJ and high attenuation soft tissue density in the urinary bladder suggesting blood products/postprocedural change.     ABDOMINAL WALL/INGUINAL REGIONS: Unremarkable.     BONES: No acute fracture or suspicious osseous lesion.  Lumbar spondylosis is seen with multilevel disc space narrowing, osteophyte formation and facet hypertrophy.     IMPRESSION:        1. Persistent right hydronephrosis and hydroureter with delayed nephrogram. There is a punctate calcification at the right UVJ as well as high density material in the urinary bladder most likely blood products and a punctate calculus in the distal right   ureter contributing to a moderate degree of ureteral obstruction.  This finding can be followed sonographically to ensure resolution.  2. Moderate fat stranding in the right retroperitoneum in association with the right ureter and ureteral enhancement which could be related to recent procedure and/or developing infection/inflammation.  The study was marked in EPIC for immediate notification.        Workstation performed: [id number]       SCROTAL ULTRASOUND     INDICATION: R testicle pain, eval for  torsion.     COMPARISON: None     TECHNIQUE: Ultrasound the scrotal contents was performed with a high frequency linear transducer utilizing volumetric sweep imaging as well as standard still image techniques. Imaging performed in longitudinal and transverse orientation. Color and   spectral Doppler evaluation also performed bilaterally.     FINDINGS:     TESTES:  Testes are symmetric and normal in size.     RIGHT testis = 4.2 x 2.2 x 2.9 cm. Volume 13.9 mL  Normal contour with homogeneous smooth echotexture.  No intratesticular mass lesion or calcifications.     LEFT testis = 3.6 x 2.3 x 2.6 cm. Volume 11.5 mL  Normal contour with homogeneous smooth echotexture.  No intratesticular mass lesion or calcifications.     Doppler flow within both testes is present and symmetric.     EPIDIDYMIDES:  Normal size.  Doppler ultrasound demonstrates normal blood flow.  Epididymal cysts are seen bilaterally, less than 1 cm in diameter.     HYDROCELE: Complex right hydrocele is seen with internal echoes and scrotal jermaine.     VARICOCELE: None present.     SCROTUM: Scrotal thickness and appearance within normal limits. No evidence for extratesticular mass or hernia demonstrated.     IMPRESSION:        1. No testicular torsion.  2. Small bilateral hydroceles, complex on the right and epididymal cysts.     Workstation performed: [id number]  Imaging reviewed - both report and images personally reviewed.     Labs:  Recent Labs     06/06/24  0942   WBC 7.84     Recent Labs     06/06/24  0942   HGB 16.9       Recent Labs     06/06/24  0942   CREATININE 1.27       Microbiology:  Urine culture pending    History:  Social History     Socioeconomic History    Marital status: /Civil Union     Spouse name: Not on file    Number of children: Not on file    Years of education: Not on file    Highest education level: Not on file   Occupational History    Not on file   Tobacco Use    Smoking status: Never    Smokeless tobacco: Never    Vaping Use    Vaping status: Never Used   Substance and Sexual Activity    Alcohol use: Not Currently    Drug use: Never    Sexual activity: Not on file   Other Topics Concern    Not on file   Social History Narrative    Not on file     Social Determinants of Health     Financial Resource Strain: Low Risk  (2/2/2024)    Overall Financial Resource Strain (CARDIA)     Difficulty of Paying Living Expenses: Not hard at all   Food Insecurity: No Food Insecurity (2/2/2024)    Hunger Vital Sign     Worried About Running Out of Food in the Last Year: Never true     Ran Out of Food in the Last Year: Never true   Transportation Needs: No Transportation Needs (2/2/2024)    PRAPARE - Transportation     Lack of Transportation (Medical): No     Lack of Transportation (Non-Medical): No   Physical Activity: Not on file   Stress: Not on file   Social Connections: Not on file   Intimate Partner Violence: Not on file   Housing Stability: Low Risk  (3/28/2024)    Housing Stability Vital Sign     Unable to Pay for Housing in the Last Year: No     Number of Places Lived in the Last Year: 1     Unstable Housing in the Last Year: No     No past medical history on file.  Past Surgical History:   Procedure Laterality Date    BLADDER STONE REMOVAL      FL RETROGRADE PYELOGRAM  6/2/2024    DE CYSTO/URETERO W/LITHOTRIPSY &INDWELL STENT INSRT Right 6/2/2024    Procedure: CYSTOSCOPY URETEROSCOPY WITH LITHOTRIPSY HOLMIUM LASER, RETROGRADE PYELOGRAM AND INSERTION STENT URETERAL;  Surgeon: Javi Bishop MD;  Location:  MAIN OR;  Service: Urology     No family history on file.    The following portions of the patient's history were reviewed and updated as appropriate: allergies, current medications, past family history, past medical history, past social history, past surgical history and problem list    JACEY Ching  Date: 6/6/2024 Time: 5:44 PM

## 2024-06-06 NOTE — PROGRESS NOTES
INTERNAL MEDICINE RESIDENCY SENIOR ADMISSION NOTE     Name: Hiral Cullen   Age & Sex: 53 y.o. male   MRN: [id number]  Unit/Bed#: [facility]   Encounter: [id number]  Primary Care Provider: Kris Shell MD    Admit to team: SOD Team A    Patient seen and examined. Reviewed H&P per Dr. Shell. Agree with the assessment and plan with any exception/addition as noted below: 53-year-old male patient with past medical history of recurrent nephrolithiasis, polycythemia who presented to the ED complaining of right-sided flank pain.  Patient reported the pain started on Wednesday night and was getting worse after he pulled his stent.  Patient described the pain as sharp, comes and goes, radiates to testicles.  Patient denied any nausea or vomiting. Patient reported blood in urine since Saturday.  Patient also reported burning urination.  Of note patient was discharged on Monday from the hospital after being treated for right proximal ureteral calculus status post right 24-6 Maltese double-J ureteral stent with a string by urology.  In the ED patient blood pressure 154/102, respiration 20, pulse 72, temperature 97.6.  Labs CBC is unremarkable, BMP is unremarkable, UA with a few white blood cells.  CT in the ED showed Persistent right hydronephrosis and hydroureter with delayed nephrogram. There is a punctate calcification at the right UVJ as well as high density material in the urinary bladder most likely blood products and a punctate calculus in the distal right.  ureter contributing to a moderate degree of ureteral obstruction.  Patient will be admitted for pain control and urology consult  Patient is level 1 full code  Patient will be admitted to SOD A    Active Problems:    Nephrolithiasis    Polycythemia    Right flank pain with hematuria  Status post right proximal ureteral calculus removal and ureteral stent placement  Patient started to have pain after pulling his stent on Wednesday  Plan  Continue IV  fluid  Continue pain medication 2.5 oxy for moderate pain, 5 mg oxy for severe pain and 0.5 hydromorphone for breakthrough pain  Urology consult appreciate recommendations  Continue tamsulosin, oxybutynin and Pyridium  Rest of management per H&P  Urology is considering Miranda with irrigation    Code Status: Level 1 - Full Code  Admission Status: OBSERVATION  Disposition: Patient requires Med/Surg  Expected Length of Stay: 2

## 2024-06-06 NOTE — ED PROVIDER NOTES
Emergency Department Note- Hiral Cullen 53 y.o. male MRN: [id number]    Unit/Bed#: COLE Encounter: [id number]        History of Present Illness     Patient is a 53-year-old male with a history of nephrolithiasis, polycythemia,, previous cholecystectomy, accompanied by his wife.  He is predominantly Bangladeshi-speaking, interviewed with a  service.  Hospitalized June 1 through Emily 3, 2024 right back and flank pain.  CT showed a right obstructing ureteral stone, he had an GLADYS,  Patient underwent stent placement and stone removal with urology on June 2, felt that the GLADYS was postobstructive.  Improvement on renal function on Emily 3, stable for discharge, treated with antibiotics in the hospital and discharged on 3-day course of Keflex.  With Norco for pain.  Plan is for stent removal on June 12.    Patient presents because last evening he had some increasing right groin and right testicle pain, and right periumbilical pain, worse when he moves, better with remaining still.  He had some hematuria and dysuria initially when the Miranda catheter was placed during hospitalization but that has resolved.  He says that he has no nausea, no vomiting, no dysuria hematuria currently, pain is worse when he twists and bends.  He has tried his prescription Norco that he was prescribed upon hospital discharge which was not helpful this morning.  No anorexia.  No migration to his pain.  Wife indicates there is been no mental status changes.        REVIEW OF SYSTEMS    Positive for the above    Historical Information   No past medical history on file.  Past Surgical History:   Procedure Laterality Date    BLADDER STONE REMOVAL      FL RETROGRADE PYELOGRAM  6/2/2024    WA CYSTO/URETERO W/LITHOTRIPSY &INDWELL STENT INSRT Right 6/2/2024    Procedure: CYSTOSCOPY URETEROSCOPY WITH LITHOTRIPSY HOLMIUM LASER, RETROGRADE PYELOGRAM AND INSERTION STENT URETERAL;  Surgeon: Javi Bishop MD;  Location: [facility]  OR;  Service: Urology     Social History   Social History     Substance and Sexual Activity   Alcohol Use Not Currently     Social History     Substance and Sexual Activity   Drug Use Never     Social History     Tobacco Use   Smoking Status Never   Smokeless Tobacco Never     Family History: No family history on file.    MEDICATIONS:  No current facility-administered medications on file prior to encounter.     Current Outpatient Medications on File Prior to Encounter   Medication Sig Dispense Refill    cephalexin (KEFLEX) 500 mg capsule Take 1 capsule (500 mg total) by mouth every 6 (six) hours for 3 days 12 capsule 0    gabapentin (Neurontin) 100 mg capsule Take 3 capsules (300 mg total) by mouth daily at bedtime AND 1 capsule (100 mg total) every morning. 120 capsule 3    HYDROcodone-acetaminophen (Norco) 5-325 mg per tablet Take 1 tablet by mouth every 6 (six) hours as needed for pain for up to 5 days Max Daily Amount: 4 tablets 20 tablet 0    tadalafil (CIALIS) 5 MG tablet Take 1 tablet (5 mg total) by mouth in the morning 30 tablet 0    tamsulosin (FLOMAX) 0.4 mg Take 1 capsule (0.4 mg total) by mouth daily with dinner 10 capsule 0    aspirin (ECOTRIN LOW STRENGTH) 81 mg EC tablet Take 81 mg by mouth daily      cholecalciferol (VITAMIN D3) 1,000 units tablet Take 1 tablet (1,000 Units total) by mouth daily 30 tablet 3    Diclofenac Sodium (VOLTAREN) 1 % Apply 2 g topically 4 (four) times a day For lower back pain. 100 g 3    phenazopyridine (PYRIDIUM) 100 mg tablet Take 1 tablet (100 mg total) by mouth 3 (three) times a day as needed for bladder spasms (Patient not taking: Reported on 6/6/2024) 10 tablet 0     ALLERGIES:  No Known Allergies    Vitals:    06/06/24 0909   BP: (!) 154/102   TempSrc: Temporal   Pulse: 72   Resp: 20   Patient Position - Orthostatic VS: Sitting   Temp: 97.6 °F (36.4 °C)       PHYSICAL EXAM    General:  Patient is well-appearing  Head:  Atraumatic  Eyes:  Conjunctiva pink  ENT:   Mucous membranes are moist  Neck:  Supple  Cardiac:  S1-S2, without murmurs  Lungs:  Clear to auscultation bilaterally  Abdomen: Some mild epigastric and right lower abdominal tenderness as well as right inguinal tenderness.  No hernia.   examination shows right testicular tenderness, no left testicular tenderness.  He has no rash to the penis, scrotum or perineal area.  No evidence of hernia.  No CVA tenderness.  No tympany, no rigidity, no guarding.  Extremities:  Normal range of motion  Neurologic:  Awake, fluent speech, normal comprehension, AAOx3  Skin:  Pink warm and dry  Psychiatric:  Alert, pleasant, cooperative      Labs Reviewed   BASIC METABOLIC PANEL - Abnormal       Result Value Ref Range Status    Sodium 137  135 - 147 mmol/L Final    Potassium 3.6  3.5 - 5.3 mmol/L Final    Chloride 103  96 - 108 mmol/L Final    CO2 26  21 - 32 mmol/L Final    ANION GAP 8  4 - 13 mmol/L Final    BUN 32 (*) 5 - 25 mg/dL Final    Creatinine 1.27  0.60 - 1.30 mg/dL Final    Comment: Standardized to IDMS reference method    Glucose 104  65 - 140 mg/dL Final    Comment: If the patient is fasting, the ADA then defines impaired fasting glucose as > 100 mg/dL and diabetes as > or equal to 123 mg/dL.    Calcium 9.5  8.4 - 10.2 mg/dL Final    eGFR 63  ml/min/1.73sq m Final    Narrative:     National Kidney Disease Foundation guidelines for Chronic Kidney Disease (CKD):                     Stage 1 with normal or high GFR (GFR > 90 mL/min/1.73 square meters)                    Stage 2 Mild CKD (GFR = 60-89 mL/min/1.73 square meters)                    Stage 3A Moderate CKD (GFR = 45-59 mL/min/1.73 square meters)                    Stage 3B Moderate CKD (GFR = 30-44 mL/min/1.73 square meters)                    Stage 4 Severe CKD (GFR = 15-29 mL/min/1.73 square meters)                    Stage 5 End Stage CKD (GFR <15 mL/min/1.73 square meters)                  Note: GFR calculation is accurate only with a steady state  creatinine   URINALYSIS WITH MICROSCOPIC - Abnormal    Color, UA Light Yellow   Final    Clarity, UA Clear   Final    Specific Gravity, UA 1.015  1.003 - 1.030 Final    pH, UA 5.5  4.5, 5.0, 5.5, 6.0, 6.5, 7.0, 7.5, 8.0 Final    Leukocytes, UA Trace (*) Negative Final    Nitrite, UA Negative  Negative Final    Protein, UA 50 (1+) (*) Negative mg/dl Final    Glucose, UA Negative  Negative mg/dl Final    Ketones, UA Negative  Negative mg/dl Final    Urobilinogen, UA <2.0  <2.0 mg/dl mg/dl Final    Bilirubin, UA Negative  Negative Final    Occult Blood, UA Large (*) Negative Final    RBC, UA Innumerable (*) None Seen, 1-2 /hpf Final    WBC, UA 10-20 (*) None Seen, 1-2 /hpf Final    Epithelial Cells None Seen  None Seen, Occasional /hpf Final    Bacteria, UA None Seen  None Seen, Occasional /hpf Final    Granular Casts, UA 0-3 (*) (none) Final    Budding Yeast Present   Final   LIPASE - Normal    Lipase 32  11 - 82 u/L Final   HEPATIC FUNCTION PANEL - Normal    Total Bilirubin 0.93  0.20 - 1.00 mg/dL Final    Comment: Use of this assay is not recommended for patients undergoing treatment with eltrombopag due to the potential for falsely elevated results.  N-acetyl-p-benzoquinone imine (metabolite of Acetaminophen) will generate erroneously low results in samples for patients that have taken an overdose of Acetaminophen.    Bilirubin, Direct 0.15  0.00 - 0.20 mg/dL Final    Alkaline Phosphatase 50  34 - 104 U/L Final    AST 25  13 - 39 U/L Final    ALT 44  7 - 52 U/L Final    Comment: Specimen collection should occur prior to Sulfasalazine administration due to the potential for falsely depressed results.     Total Protein 6.9  6.4 - 8.4 g/dL Final    Albumin 4.3  3.5 - 5.0 g/dL Final   URINE CULTURE   CBC AND DIFFERENTIAL    WBC 7.84  4.31 - 10.16 Thousand/uL Final    RBC 5.41  3.88 - 5.62 Million/uL Final    Hemoglobin 16.9  12.0 - 17.0 g/dL Final    Hematocrit 48.3  36.5 - 49.3 % Final    MCV 89  82 - 98 fL Final     MCH 31.2  26.8 - 34.3 pg Final    MCHC 35.0  31.4 - 37.4 g/dL Final    RDW 12.1  11.6 - 15.1 % Final    MPV 10.8  8.9 - 12.7 fL Final    Platelets 238  149 - 390 Thousands/uL Final    nRBC 0  /100 WBCs Final    Segmented % 75  43 - 75 % Final    Immature Grans % 0  0 - 2 % Final    Lymphocytes % 14  14 - 44 % Final    Monocytes % 7  4 - 12 % Final    Eosinophils Relative 3  0 - 6 % Final    Basophils Relative 1  0 - 1 % Final    Absolute Neutrophils 5.86  1.85 - 7.62 Thousands/µL Final    Absolute Immature Grans 0.03  0.00 - 0.20 Thousand/uL Final    Absolute Lymphocytes 1.12  0.60 - 4.47 Thousands/µL Final    Absolute Monocytes 0.53  0.17 - 1.22 Thousand/µL Final    Eosinophils Absolute 0.24  0.00 - 0.61 Thousand/µL Final    Basophils Absolute 0.06  0.00 - 0.10 Thousands/µL Final       Medications   HYDROmorphone (DILAUDID) injection 0.5 mg (0.5 mg Intravenous Given 6/6/24 1419)   HYDROmorphone (DILAUDID) injection 0.5 mg (0.5 mg Intravenous Given 6/6/24 0942)   iohexol (OMNIPAQUE) 350 MG/ML injection (MULTI-DOSE) 100 mL (100 mL Intravenous Given 6/6/24 1249)       CT abdomen pelvis w contrast   Final Result         1. Persistent right hydronephrosis and hydroureter with delayed nephrogram. There is a punctate calcification at the right UVJ as well as high density material in the urinary bladder most likely blood products and a punctate calculus in the distal right    ureter contributing to a moderate degree of ureteral obstruction.   This finding can be followed sonographically to ensure resolution.   2. Moderate fat stranding in the right retroperitoneum in association with the right ureter and ureteral enhancement which could be related to recent procedure and/or developing infection/inflammation.   The study was marked in EPIC for immediate notification.         Workstation performed: [id number]         US scrotum and testicles   Final Result         1. No testicular torsion.   2. Small bilateral hydroceles,  complex on the right and epididymal cysts.      Workstation performed: [id number]             ED Course as of 06/06/24 1431   Thu Jun 06, 2024   1222 Testicular ultrasound reviewed, no evidence testicular torsion.  I independently interpreted this as well and agree there is no evidence of testicular torsion   1326 Patient reassessed, no change in the above physical exam findings but still feeling uncomfortable.       Assessment & Plan     ED Medical Decision Making:    Patient has intractable pain due to hydroureteronephrosis, possibly from small ureteral stone versus clot burden.  No fever, no signs of infection or indication for immediate urologic intervention.  No evidence of testicular torsion.  Labs show no evidence of life-threatening metabolic abnormality.    The patient was evaluated for sepsis in the emergency department. After that assessment, at the time of admission, no sepsis, severe sepsis, or septic shock was found.    MEDICAL DECISION MAKING CODING    COLLECTION AND INTERPRETATION OF DATA  I reviewed prior external notes, including total discharge summary as noted above    I ordered each unique test  Tests reviewed personally by me:  ECG: See my ED course  Labs: See above  Imaging: I independently interpreted the CT abdomen and pelvis and found right-sided hydroureteronephrosis, testicular ultrasound showing no acute pathology.            Time reflects when diagnosis was documented in both MDM as applicable and the Disposition within this note       Time User Action Codes Description Comment    6/6/2024  2:14 PM Janusz Bermudez [R10.9] Acute right flank pain     6/6/2024  2:15 PM Janusz Bermudez [N13.30] Hydronephrosis of right kidney     6/6/2024  2:15 PM Janusz Bermudez Add [N13.4] Hydroureter on right           ED Disposition       ED Disposition   Admit    Condition   Stable    Date/Time   u Jun 6, 2024  2:15 PM    Comment   Case was discussed with Dr. Hernandez and the patient's admission status was  agreed to be Admission Status: observation status to the service of Dr. Dr. Maldonado .               Follow-up Information    None         Patient's Medications   Discharge Prescriptions    No medications on file          Janusz Bermudez DO  06/06/24 4115

## 2024-06-06 NOTE — ASSESSMENT & PLAN NOTE
Ultrasound scrotum and testicles small bilateral hydroceles (complex on the right) and bilateral epididymal cysts  Scrotal support  NSAIDs for discomfort    Plan:  Now resolved

## 2024-06-06 NOTE — ASSESSMENT & PLAN NOTE
Recent right ureteral stone status post cystoscopy, retrograde pyelography, right ureteroscopy, laser lithotripsy and insertion of right ureteral stent presenting with persistent flank pain after ureteral stent removal.  CT demonstrating punctate UVJ calculus and resultant hydronephrosis.  As well as blood products in dependent portion of bladder.    Admitted to internal medicine three-way Miranda with CBI overnight evening  Closely clear.  He colored urine.  Has remained afebrile, hemodynamically stable and is now pain relieved.  Hemoglobin 15.5, no leukocytosis or acute kidney injury.  Urine culture negative for growth.    Plan:  Continue hydration  Flomax  Bowel regimen  Discharge at the discretion of the internal medicine service with outpatient urology follow-up.

## 2024-06-06 NOTE — ASSESSMENT & PLAN NOTE
Three-way Miranda with CBI producing grossly clear urine overnight.  Hemodynamically stable--hemoglobin exceeds 15    Plan:  Discontinue Miranda catheter  Continue Flomax  Discharge when patient voids with close urologic follow-up.

## 2024-06-06 NOTE — LETTER
[facility] CW[facility]  [id number] [id number]  Dept: [phone]    June 7, 2024     Patient: Hiral Cullen   YOB: 1970   Date of Visit: 6/6/2024       To Whom it May Concern:    Hiral Cullen is under my professional care. He was seen in the hospital from 6/6/2024 to 06/07/24. He may return to work on 06/08/2024 without limitations.    If you have any questions or concerns, please don't hesitate to call.         Sincerely,          Nguyễn Hayden MD

## 2024-06-06 NOTE — ASSESSMENT & PLAN NOTE
Endorsing some testicular tenderness and swelling R > L. No inguinal hernia palpated.     Testicle ultrasound: 1. No testicular torsion. 2. Small bilateral hydroceles, complex on the right and epididymal cysts. Likely some local trauma associated with stent removal, with pain involving testicular area, radiating to R groin.     Plan  Pain control

## 2024-06-06 NOTE — ASSESSMENT & PLAN NOTE
Patient has a history of nephrolithiasis s/p stenting in the past with recurrent UTIs. CT from 4/28/2024 was notable for an obstructing left 6 mm UVJ stone, and a nonobstructing right renal lower pole 5 mm stone. More recently, patient admitted from 6/1-6/3 for mild right-sided hydroureteronephrosis secondary to a 4 mm calculus in the proximal right ureter. Underwent cystoscopy with right ureteroscopy, holmium laser lithotripsy, right retrograde pyelography and right ureteral stent insertion. Self-removed stent as instructed on 1 day PTA. CT during this admission demonstrating persistent R hydro and hydroureter with delayed nephrogram. Evidence of blood products and calculus in distal R ureter contributing to ureteral obstruction. Moderate fat stranding also seen. Plan for urologic consult, pain control, hydration.      Most likely ureter stent colic, and inflammation with some residual stone passage. Likely some local trauma associated with stent removal, with pain involving testicular area, radiating to R groin. Afebrile, no white count, and UA without bacteria/nitrite. Leukocytes in UA most likely reactive. Thus, low suspicion for infection. Will complete abx course with cephalexin, per urology.       Plan:  Urology consult  Per urology, no plans for OR; continue with sx management and a three-way Miranda catheter with manual irrigation until clear   Regular diet  Continue home tadalafil and tamsulosin  Stent colic pain control with pyridium and oxybutynin  Tylenol, oxycodone 2.5/5 and breakthrough Dilaudid ordered for pain control  Zofran as needed for nausea  IV fluid hydration with isolyte 125 cc/h  Consider bladder US with PVR if concern for bladder obstruction  Cw 3 day course of Keflex which should end 6/7/24  If showing signs of infection, consider broadening to CTX

## 2024-06-07 ENCOUNTER — TELEPHONE (OUTPATIENT)
Dept: UROLOGY | Facility: AMBULATORY SURGERY CENTER | Age: 54
End: 2024-06-07

## 2024-06-07 VITALS
RESPIRATION RATE: 17 BRPM | SYSTOLIC BLOOD PRESSURE: 113 MMHG | DIASTOLIC BLOOD PRESSURE: 67 MMHG | OXYGEN SATURATION: 95 % | TEMPERATURE: 98.4 F | HEART RATE: 59 BPM

## 2024-06-07 DIAGNOSIS — N20.0 NEPHROLITHIASIS: Primary | ICD-10-CM

## 2024-06-07 LAB
ANION GAP SERPL CALCULATED.3IONS-SCNC: 10 MMOL/L (ref 4–13)
BACTERIA UR CULT: NORMAL
BUN SERPL-MCNC: 28 MG/DL (ref 5–25)
CALCIUM SERPL-MCNC: 8.6 MG/DL (ref 8.4–10.2)
CHLORIDE SERPL-SCNC: 101 MMOL/L (ref 96–108)
CO2 SERPL-SCNC: 27 MMOL/L (ref 21–32)
CREAT SERPL-MCNC: 1.2 MG/DL (ref 0.6–1.3)
ERYTHROCYTE [DISTWIDTH] IN BLOOD BY AUTOMATED COUNT: 12.1 % (ref 11.6–15.1)
GFR SERPL CREATININE-BSD FRML MDRD: 68 ML/MIN/1.73SQ M
GLUCOSE SERPL-MCNC: 90 MG/DL (ref 65–140)
HCT VFR BLD AUTO: 44.4 % (ref 36.5–49.3)
HGB BLD-MCNC: 15.5 G/DL (ref 12–17)
MCH RBC QN AUTO: 31.5 PG (ref 26.8–34.3)
MCHC RBC AUTO-ENTMCNC: 34.9 G/DL (ref 31.4–37.4)
MCV RBC AUTO: 90 FL (ref 82–98)
PLATELET # BLD AUTO: 220 THOUSANDS/UL (ref 149–390)
PMV BLD AUTO: 10.7 FL (ref 8.9–12.7)
POTASSIUM SERPL-SCNC: 4.1 MMOL/L (ref 3.5–5.3)
RBC # BLD AUTO: 4.92 MILLION/UL (ref 3.88–5.62)
SODIUM SERPL-SCNC: 138 MMOL/L (ref 135–147)
WBC # BLD AUTO: 6.06 THOUSAND/UL (ref 4.31–10.16)

## 2024-06-07 PROCEDURE — 99238 HOSP IP/OBS DSCHRG MGMT 30/<: CPT | Performed by: INTERNAL MEDICINE

## 2024-06-07 PROCEDURE — 99232 SBSQ HOSP IP/OBS MODERATE 35: CPT | Performed by: NURSE PRACTITIONER

## 2024-06-07 PROCEDURE — 80048 BASIC METABOLIC PNL TOTAL CA: CPT

## 2024-06-07 PROCEDURE — 85027 COMPLETE CBC AUTOMATED: CPT

## 2024-06-07 RX ADMIN — CEPHALEXIN 500 MG: 500 CAPSULE ORAL at 12:11

## 2024-06-07 RX ADMIN — CEPHALEXIN 500 MG: 500 CAPSULE ORAL at 04:48

## 2024-06-07 RX ADMIN — PHENAZOPYRIDINE 100 MG: 100 TABLET ORAL at 08:40

## 2024-06-07 RX ADMIN — HYDROMORPHONE HYDROCHLORIDE 0.5 MG: 1 INJECTION, SOLUTION INTRAMUSCULAR; INTRAVENOUS; SUBCUTANEOUS at 00:28

## 2024-06-07 RX ADMIN — PHENAZOPYRIDINE 100 MG: 100 TABLET ORAL at 12:11

## 2024-06-07 RX ADMIN — OXYCODONE HYDROCHLORIDE 5 MG: 5 TABLET ORAL at 04:49

## 2024-06-07 RX ADMIN — OXYBUTYNIN CHLORIDE 5 MG: 5 TABLET ORAL at 08:40

## 2024-06-07 NOTE — ADDENDUM NOTE
Addended by: BOWEN DOVER on: 6/7/2024 02:48 PM     Modules accepted: Orders    
Detail Level: Detailed

## 2024-06-07 NOTE — PROGRESS NOTES
"    INTERNAL MEDICINE RESIDENCY PROGRESS NOTE     Name: Hiral Cullen   Age & Sex: 53 y.o. male   MRN: [id number]  Unit/Bed#: CW2 214-01   Encounter: [id number]  Team: SOD Team A    PATIENT INFORMATION     Name: Hiral Cullen   Age & Sex: 53 y.o. male   MRN: [id number]  Hospital Stay Days: 0    ASSESSMENT/PLAN     Principal Problem:    Nephrolithiasis  Active Problems:    Polycythemia    Testicular pain, right    Gross hematuria      Testicular pain, right  Assessment & Plan  Endorsing some testicular tenderness and swelling R > L. No inguinal hernia palpated.     Testicle ultrasound: 1. No testicular torsion. 2. Small bilateral hydroceles, complex on the right and epididymal cysts. Likely some local trauma associated with stent removal, with pain involving testicular area, radiating to R groin.     Plan  Pain control    Polycythemia  Assessment & Plan  Patient has history of polycythemia of unclear etiology.  Per chart review, patient was previously living in [facility] and had been told he has \"thick blood\", had been taking 1 baby aspirin daily.  Was partially attributed to him living [place] higher [place]approximately [place]), and hemoglobin has trended downward slightly over the last several months since moving here.     Plan  Holding asa in setting of gross hematuria    * Nephrolithiasis  Assessment & Plan  Patient has a history of nephrolithiasis s/p stenting in the past with recurrent UTIs. CT from 4/28/2024 was notable for an obstructing left 6 mm UVJ stone, and a nonobstructing right renal lower pole 5 mm stone. More recently, patient admitted from 6/1-6/3 for mild right-sided hydroureteronephrosis secondary to a 4 mm calculus in the proximal right ureter. Underwent cystoscopy with right ureteroscopy, holmium laser lithotripsy, right retrograde pyelography and right ureteral stent insertion. Self-removed stent as instructed on 1 day PTA. CT during this admission demonstrating " persistent R hydro and hydroureter with delayed nephrogram. Evidence of blood products and calculus in distal R ureter contributing to ureteral obstruction. Moderate fat stranding also seen. Plan for urologic consult, pain control, hydration.      Most likely ureter stent colic, and inflammation with some residual stone passage. Likely some local trauma associated with stent removal, with pain involving testicular area, radiating to R groin. Afebrile, no white count, and UA without bacteria/nitrite. Leukocytes in UA most likely reactive. Thus, low suspicion for infection. Will complete abx course with cephalexin, per urology.       Plan:  Urology consult  Per urology, no plans for OR; continue with sx management and a three-way Miranda catheter with manual irrigation until clear   Regular diet  Continue home tadalafil and tamsulosin  Stent colic pain control with pyridium and oxybutynin  Tylenol, oxycodone 2.5/5 and breakthrough Dilaudid ordered for pain control  Zofran as needed for nausea  IV fluid hydration with isolyte 125 cc/h  Consider bladder US with PVR if concern for bladder obstruction  Cw 3 day course of Keflex which should end today  If showing signs of infection, consider broadening to CTX        Disposition: ***     SUBJECTIVE     Patient seen and examined. No acute events overnight. ***    OBJECTIVE     Vitals:    24 0909 24 1555 24 2311   BP: (!) 154/102 114/72 106/58   BP Location: Left arm     Pulse: 72 60 56   Resp: 20     Temp: 97.6 °F (36.4 °C) 98.1 °F (36.7 °C)    TempSrc: Temporal     SpO2: 99% 97% 92%      Temperature:   Temp (24hrs), Av.9 °F (36.6 °C), Min:97.6 °F (36.4 °C), Max:98.1 °F (36.7 °C)    Temperature: 98.1 °F (36.7 °C)  Intake & Output:  I/O          0701   0700  0701   0700    Urine  2650    Total Output  2650    Net  -2650                Weights:        There is no height or weight on file to calculate BMI.  Weight (last 2 days)       None           Physical Exam  LABORATORY DATA     Labs: I have personally reviewed pertinent reports.  Results from last 7 days   Lab Units 06/07/24 0448 06/06/24 0942 06/03/24 0537 06/02/24  0428   WBC Thousand/uL 6.06 7.84 8.27 8.53   HEMOGLOBIN g/dL 15.5 16.9 14.8 15.7   HEMATOCRIT % 44.4 48.3 43.3 45.3   PLATELETS Thousands/uL 220 238 170 178   SEGS PCT %  --  75 75 79*   MONO PCT %  --  7 9 8   EOS PCT %  --  3 1 0      Results from last 7 days   Lab Units 06/07/24 0448 06/06/24  0942 06/03/24  0537 06/02/24 0428 06/01/24  2307   POTASSIUM mmol/L 4.1 3.6 3.7   < > 3.8   CHLORIDE mmol/L 101 103 108   < > 104   CO2 mmol/L 27 26 23   < > 25   BUN mg/dL 28* 32* 22   < > 25   CREATININE mg/dL 1.20 1.27 1.12   < > 1.53*   CALCIUM mg/dL 8.6 9.5 8.0*   < > 9.1   ALK PHOS U/L  --  50  --   --  53   ALT U/L  --  44  --   --  34   AST U/L  --  25  --   --  25    < > = values in this interval not displayed.                            IMAGING & DIAGNOSTIC TESTING     Radiology Results: I have personally reviewed pertinent reports.  CT abdomen pelvis w contrast    Result Date: 6/6/2024  Impression: 1. Persistent right hydronephrosis and hydroureter with delayed nephrogram. There is a punctate calcification at the right UVJ as well as high density material in the urinary bladder most likely blood products and a punctate calculus in the distal right ureter contributing to a moderate degree of ureteral obstruction. This finding can be followed sonographically to ensure resolution. 2. Moderate fat stranding in the right retroperitoneum in association with the right ureter and ureteral enhancement which could be related to recent procedure and/or developing infection/inflammation. The study was marked in EPIC for immediate notification. Workstation performed: [id number]     US scrotum and testicles    Result Date: 6/6/2024  Impression: 1. No testicular torsion. 2. Small bilateral hydroceles, complex on the right and epididymal  "cysts. Workstation performed: [id number]     Other Diagnostic Testing: I have personally reviewed pertinent reports.    ACTIVE MEDICATIONS     Current Facility-Administered Medications   Medication Dose Route Frequency   • acetaminophen (TYLENOL) tablet 650 mg  650 mg Oral Q6H PRN   • cephalexin (KEFLEX) capsule 500 mg  500 mg Oral Q6H CHINO   • HYDROmorphone (DILAUDID) injection 0.5 mg  0.5 mg Intravenous Q4H PRN   • multi-electrolyte (PLASMALYTE-A/ISOLYTE-S PH 7.4) IV solution  125 mL/hr Intravenous Continuous   • naloxone (NARCAN) 0.04 mg/mL syringe 0.04 mg  0.04 mg Intravenous Q1MIN PRN   • ondansetron (ZOFRAN-ODT) dispersible tablet 4 mg  4 mg Oral Q6H PRN   • oxybutynin (DITROPAN) tablet 5 mg  5 mg Oral TID   • oxyCODONE (ROXICODONE) split tablet 2.5 mg  2.5 mg Oral Q4H PRN    Or   • oxyCODONE (ROXICODONE) IR tablet 5 mg  5 mg Oral Q4H PRN   • phenazopyridine (PYRIDIUM) tablet 100 mg  100 mg Oral TID With Meals   • polyethylene glycol (MIRALAX) packet 17 g  17 g Oral Daily PRN   • tadalafil (CIALIS) tablet 5 mg  5 mg Oral Daily   • tamsulosin (FLOMAX) capsule 0.4 mg  0.4 mg Oral Daily With Dinner       VTE Pharmacologic Prophylaxis: Reason for no pharmacologic prophylaxis low risk  VTE Mechanical Prophylaxis: sequential compression device    Portions of the record may have been created with voice recognition software.  Occasional wrong word or \"sound a like\" substitutions may have occurred due to the inherent limitations of voice recognition software.  Read the chart carefully and recognize, using context, where substitutions have occurred.  ==  Juan Schaefer, MS4  [facility]  Internal Medicine  "

## 2024-06-07 NOTE — DISCHARGE SUMMARY
"      INTERNAL MEDICINE RESIDENCY DISCHARGE SUMMARY     Hiral Cullen   53 y.o. male  MRN: [id number]  Room/Bed: 2 214/[facility] 214-01     [facility] BE CW2   Encounter: [id number]    Principal Problem:    Nephrolithiasis  Active Problems:    Polycythemia    Testicular pain, right    Gross hematuria      Testicular pain, right  Assessment & Plan  Endorsing some testicular tenderness and swelling R > L. No inguinal hernia palpated.     Testicle ultrasound: 1. No testicular torsion. 2. Small bilateral hydroceles, complex on the right and epididymal cysts. Likely some local trauma associated with stent removal, with pain involving testicular area, radiating to R groin.     Plan  Pain control    Polycythemia  Assessment & Plan  Patient has history of polycythemia of unclear etiology.  Per chart review, patient was previously living in [facility] and had been told he has \"thick blood\", had been taking 1 baby aspirin daily.  Was partially attributed to him living [place] higher [place]approximately [place]), and hemoglobin has trended downward slightly over the last several months since moving here.     Plan  Holding asa in setting of gross hematuria    * Nephrolithiasis  Assessment & Plan  Patient has a history of nephrolithiasis s/p stenting in the past with recurrent UTIs. CT from 4/28/2024 was notable for an obstructing left 6 mm UVJ stone, and a nonobstructing right renal lower pole 5 mm stone. More recently, patient admitted from 6/1-6/3 for mild right-sided hydroureteronephrosis secondary to a 4 mm calculus in the proximal right ureter. Underwent cystoscopy with right ureteroscopy, holmium laser lithotripsy, right retrograde pyelography and right ureteral stent insertion. Self-removed stent as instructed on 1 day PTA. CT during this admission demonstrating persistent R hydro and hydroureter with delayed nephrogram. Evidence of blood products and calculus in distal R " "ureter contributing to ureteral obstruction. Moderate fat stranding also seen. Plan for urologic consult, pain control, hydration.      Most likely ureter stent colic, and inflammation with some residual stone passage. Likely some local trauma associated with stent removal, with pain involving testicular area, radiating to R groin. Afebrile, no white count, and UA without bacteria/nitrite. Leukocytes in UA most likely reactive. Thus, low suspicion for infection. Will complete abx course with cephalexin, per urology.       Plan:  Urology consult  Per urology, no plans for OR; continue with sx management and a three-way Miranda catheter with manual irrigation until clear   Regular diet  Continue home tadalafil and tamsulosin  Stent colic pain control with pyridium and oxybutynin  Tylenol, oxycodone 2.5/5 and breakthrough Dilaudid ordered for pain control  Zofran as needed for nausea  IV fluid hydration with isolyte 125 cc/h  Consider bladder US with PVR if concern for bladder obstruction  Cw 3 day course of Keflex which should end 6/7/24  If showing signs of infection, consider broadening to CTX        DETAILS OF HOSPITAL COURSE     H&P per Dr. Shell: \"Pt is a 52 Yo M with PMH of erythrocytosis, bilateral nonobstructive nephrolithiasis, BPH, chronic back pain presenting to the hospital for acute on chronic R flank pain. Patient has a history of nephrolithiasis s/p stenting in the past with recurrent UTIs. CT from 4/28/2024 was notable for an obstructing left 6 mm UVJ stone, and a nonobstructing right renal lower pole 5 mm stone. More recently, patient was admitted from 6/1-6/3 for mild right-sided hydroureteronephrosis secondary to a 4 mm calculus in the proximal right ureter. He underwent right ureteroscopy, holmium laser lithotripsy, right retrograde pyelography and right ureteral stent insertion. Fluoroscopic guidance for removal of a right ureteral stone and stent placement. Upon discharge, patient was instructed to " "take norco for pain, and 3-day course of cephalexin. Afterwards, his pain was relatively controlled with norco, but 2 hours after he pulled the stent out (as instructed), he experience exquisite R flank pain and R testicular pain, radiating the R groin.     In the ED, afebrile and slightly hypertensive, not tachycardic.  On exam, tender over the right flank as well as R testicular pain, radiating to R groin.  Chemistry notable for creatinine of 1.27, which is near his baseline. CBC unremarkable. UA showing large blood; 10-20 WBC, 0-3 granular casts, budding yeast. No bacteruria or nitrites. No growth from urine culture taken 6/2. Repeat urine culture pending. CT AP demonstrating persistent R hydro and hydroureter with delayed nephrogram. Evidence of blood products and calculus in distal R ureter contributing to ureteral obstruction. Moderate fat stranding also seen. Patient to be admitted to [place] for pain control and urologic consultation on 6/6/2024.\"    Urology consulted on patient on 6/6/24 and started him on a three-way Felix catheter w/ CBI. The patient produced grossly clear urine overnight and stent colic pain has been controlled via oxybutynin 5 mg TID, phenazopyridine 100 mg TID. Upon clear urine findings, patient's felix was discontinued. Once pt was able to void s/p felix, pt was discharged w/ follow-up to urology OP.    DISCHARGE INFORMATION     Physical Exam on Day of Discharge:  Physical Exam  Vitals and nursing note reviewed.   Constitutional:       General: He is not in acute distress.     Appearance: Normal appearance. He is not diaphoretic.      Comments: Not feverish, no chills   HENT:      Head: Normocephalic and atraumatic.      Mouth/Throat:      Mouth: Mucous membranes are moist.      Pharynx: Oropharynx is clear.   Eyes:      Conjunctiva/sclera: Conjunctivae normal.   Cardiovascular:      Rate and Rhythm: Normal rate and regular rhythm.      Pulses: Normal pulses.      Heart sounds: Normal " heart sounds.   Pulmonary:      Effort: Pulmonary effort is normal. No respiratory distress.      Breath sounds: Normal breath sounds. No wheezing.   Abdominal:      General: Abdomen is flat.      Tenderness: There is no abdominal tenderness. There is right CVA tenderness (mild). There is no guarding.   Genitourinary:     Testes: Cremasteric reflex is present.         Right: Tenderness and swelling present. Testicular hydrocele or varicocele not present. Cremasteric reflex is present.          Left: Tenderness, swelling, testicular hydrocele or varicocele not present. Cremasteric reflex is present.       Comments: grossly clear orange (Pyridium) colored urine.  Skin:     General: Skin is warm and dry.      Capillary Refill: Capillary refill takes less than 2 seconds.      Coloration: Skin is not jaundiced.   Neurological:      General: No focal deficit present.      Mental Status: He is alert and oriented to person, place, and time. Mental status is at baseline.      Cranial Nerves: No cranial nerve deficit.      Sensory: Sensation is intact.      Motor: Motor function is intact. No weakness.   Psychiatric:         Mood and Affect: Mood normal.         Behavior: Behavior normal.         Thought Content: Thought content normal.       PCP at Discharge: Kris Shell MD    Admitting Provider: Osorio Maldonado MD  Admission Date: 6/6/2024    Discharge Provider: Osorio Maldonado MD   Discharge Date: 6/7/2024    Discharge Disposition: Home/Self Care  Discharge Condition: good  Discharge with Lines: no    Discharge Diet: regular diet  Activity Restrictions: none  Test Results Pending at Discharge: none    Discharge Diagnoses:  Principal Problem:    Nephrolithiasis  Active Problems:    Polycythemia    Testicular pain, right    Gross hematuria  Resolved Problems:    * No resolved hospital problems. *      Consulting Providers:  Urology    Diagnostic & Therapeutic Procedures Performed:  CT abdomen pelvis w contrast    Result Date:  6/6/2024  Impression: 1. Persistent right hydronephrosis and hydroureter with delayed nephrogram. There is a punctate calcification at the right UVJ as well as high density material in the urinary bladder most likely blood products and a punctate calculus in the distal right ureter contributing to a moderate degree of ureteral obstruction. This finding can be followed sonographically to ensure resolution. 2. Moderate fat stranding in the right retroperitoneum in association with the right ureter and ureteral enhancement which could be related to recent procedure and/or developing infection/inflammation. The study was marked in EPIC for immediate notification. Workstation performed: [id number]     US scrotum and testicles    Result Date: 6/6/2024  Impression: 1. No testicular torsion. 2. Small bilateral hydroceles, complex on the right and epididymal cysts. Workstation performed: [id number]       Code Status: Level 1 - Full Code  Advance Directive & Living Will: <no information>  Power of :    POLST:      Medications:  Current Discharge Medication List        STOP taking these medications       cephalexin (KEFLEX) 500 mg capsule Comments:   Reason for Stopping:             Current Discharge Medication List        Current Discharge Medication List        CONTINUE these medications which have NOT CHANGED    Details   aspirin (ECOTRIN LOW STRENGTH) 81 mg EC tablet Take 81 mg by mouth daily      cholecalciferol (VITAMIN D3) 1,000 units tablet Take 1 tablet (1,000 Units total) by mouth daily  Qty: 30 tablet, Refills: 3    Associated Diagnoses: Chronic left-sided low back pain with left-sided sciatica      gabapentin (Neurontin) 100 mg capsule Take 3 capsules (300 mg total) by mouth daily at bedtime AND 1 capsule (100 mg total) every morning.  Qty: 120 capsule, Refills: 3    Associated Diagnoses: Chronic left-sided low back pain with left-sided sciatica      HYDROcodone-acetaminophen (Norco) 5-325 mg per tablet  "Take 1 tablet by mouth every 6 (six) hours as needed for pain for up to 5 days Max Daily Amount: 4 tablets  Qty: 20 tablet, Refills: 0    Associated Diagnoses: Nephrolithiasis      tadalafil (CIALIS) 5 MG tablet Take 1 tablet (5 mg total) by mouth in the morning  Qty: 30 tablet, Refills: 0    Associated Diagnoses: Dysuria; Benign prostatic hyperplasia, unspecified whether lower urinary tract symptoms present      tamsulosin (FLOMAX) 0.4 mg Take 1 capsule (0.4 mg total) by mouth daily with dinner  Qty: 10 capsule, Refills: 0    Associated Diagnoses: Kidney stone      Diclofenac Sodium (VOLTAREN) 1 % Apply 2 g topically 4 (four) times a day For lower back pain.  Qty: 100 g, Refills: 3    Associated Diagnoses: Chronic left-sided low back pain with left-sided sciatica      phenazopyridine (PYRIDIUM) 100 mg tablet Take 1 tablet (100 mg total) by mouth 3 (three) times a day as needed for bladder spasms  Qty: 10 tablet, Refills: 0    Associated Diagnoses: Nephrolithiasis             Allergies:  No Known Allergies    FOLLOW-UP     PCP Outpatient Follow-up: n/a    Consulting Providers Follow-up:  Follow up with urology in outpatient setting    Active Issues Requiring Follow-up: n/a    Discharge Statement:   I spent 15 minutes minutes discharging the patient. This time was spent on the day of discharge. I had direct contact with the patient on the day of discharge. Additional documentation is required if more than 30 minutes were spent on discharge.    Portions of the record may have been created with voice recognition software.  Occasional wrong word or \"sound a like\" substitutions may have occurred due to the inherent limitations of voice recognition software.  Read the chart carefully and recognize, using context, where substitutions have occurred.    ==  Augusto Ashby  [facility]  MS-3     Nguyễn Hayden MD  [facility]  Internal Medicine Residency PGY-1    "

## 2024-06-07 NOTE — PROGRESS NOTES
[facility]  Progress Note  Name: Hiral Cullen I  MRN: [id number]  Unit/Bed#: CW2 214-01 I Date of Admission: 6/6/2024   Date of Service: 6/7/2024 I Hospital Day: 0    Assessment & Plan   Gross hematuria  Assessment & Plan  Three-way Miranda with CBI producing grossly clear urine overnight.  Hemodynamically stable--hemoglobin exceeds 15    Plan:  Discontinue Miranda catheter  Continue Flomax  Discharge when patient voids with close urologic follow-up.          Testicular pain, right  Assessment & Plan  Ultrasound scrotum and testicles small bilateral hydroceles (complex on the right) and bilateral epididymal cysts  Scrotal support  NSAIDs for discomfort    Plan:  Now resolved    * Nephrolithiasis  Assessment & Plan    Recent right ureteral stone status post cystoscopy, retrograde pyelography, right ureteroscopy, laser lithotripsy and insertion of right ureteral stent presenting with persistent flank pain after ureteral stent removal.  CT demonstrating punctate UVJ calculus and resultant hydronephrosis.  As well as blood products in dependent portion of bladder.    Admitted to internal medicine three-way Miranda with CBI overnight evening  Closely clear.  He colored urine.  Has remained afebrile, hemodynamically stable and is now pain relieved.  Hemoglobin 15.5, no leukocytosis or acute kidney injury.  Urine culture negative for growth.    Plan:  Continue hydration  Flomax  Bowel regimen  Discharge at the discretion of the internal medicine service with outpatient urology follow-up.                     Subjective/Objective   Chief Complaint: Flank and testicular pain, gross hematuria    Subjective: Denies fever, chills, nausea/vomiting.  Reports resolution of flank and testicular pain.    Objective: See below    Blood pressure 113/67, pulse 59, temperature 98.4 °F (36.9 °C), resp. rate 17, SpO2 95%.,There is no height or weight on file to calculate BMI.      Intake/Output  Summary (Last 24 hours) at 6/7/2024 1009  Last data filed at 6/7/2024 0832  Gross per 24 hour   Intake 200 ml   Output 6150 ml   Net -5950 ml       Invasive Devices       Peripheral Intravenous Line  Duration             Peripheral IV 06/06/24 Distal;Right;Upper;Ventral (anterior) Arm 1 day              Drain  Duration             Continuous Bladder Irrigation Three-way <1 day    Urethral Catheter Three way 20 Fr. <1 day                    Physical Exam: General appearance: alert and oriented, in no acute distress, appears stated age, cooperative, and no distress  Head: Normocephalic, without obvious abnormality, atraumatic  Neck: no JVD and supple, symmetrical, trachea midline  Lungs: diminished breath sounds  Heart: regular rate and rhythm, S1, S2 normal, no murmur, click, rub or gallop  Abdomen: soft, non-tender; bowel sounds normal; no masses,  no organomegaly  Extremities: extremities normal, warm and well-perfused; no cyanosis, clubbing, or edema  Pulses: 2+ and symmetric  Neurologic: Grossly normal  Three-way Miranda-as CBI--grossly clear orange Pyridium colored urine.    Lab, Imaging and other studies:I have personally reviewed pertinent lab results.      Lab Results   Component Value Date    WBC 6.06 06/07/2024    HGB 15.5 06/07/2024    HCT 44.4 06/07/2024    MCV 90 06/07/2024     06/07/2024     Lab Results   Component Value Date    SODIUM 138 06/07/2024    K 4.1 06/07/2024     06/07/2024    CO2 27 06/07/2024    BUN 28 (H) 06/07/2024    CREATININE 1.20 06/07/2024    GLUC 90 06/07/2024    CALCIUM 8.6 06/07/2024

## 2024-06-07 NOTE — UTILIZATION REVIEW
Initial Clinical Review    Admission: Date/Time/Statement:   Admission Orders (From admission, onward)       Ordered        06/06/24 1416  Place in Observation  Once                          Orders Placed This Encounter   Procedures    Place in Observation     Standing Status:   Standing     Number of Occurrences:   1     Order Specific Question:   Level of Care     Answer:   Med Surg [16]     ED Arrival Information       Expected   -    Arrival   6/6/2024 09:07    Acuity   Emergent              Means of arrival   Wheelchair    Escorted by   Family Member    Service   [facility]    Admission type   Emergency              Arrival complaint   abdominal pain             Chief Complaint   Patient presents with    Groin Pain     Patient arrives with right sided groin pain. He was seen on Saturday for kidney stones. Stones removed on Sunday. Increasing pain today. Patient was discharged on Tuesday.        Initial Presentation: 53 y.o. male to ED presents for acute on chronic R flank pain. CT from 4/28/2024 was notable for an obstructing left 6 mm UVJ stone, and a nonobstructing right renal lower pole 5 mm stone. Recent hospitalization from 6/1-6/3 for mild right-sided hydroureteronephrosis secondary to a 4 mm calculus in the proximal right ureter. S/p ureteroscopy, holmium laser lithotripsy, right retrograde pyelography and right ureteral stent insertion. Fluoroscopic guidance for removal of a right ureteral stone and stent placement. Upon discharge, patient was instructed to take norco for pain, and 3-day course of cephalexin. Afterwards, his pain was relatively controlled with norco, but 2 hours after he pulled the stent out (as instructed), he experience exquisite R flank pain and R testicular pain, radiating the R groin.   In ED, slightly hypertensive. Creat 1.27, near baseline. UA showing large blood; 10-20 WBC, 0-3 granular casts, budding yeast. No bacteruria or nitrites.   On exam; tender over the right flank  as well as R testicular pain, radiating to R groin.   PMH for erythrocytosis, bilateral nonobstructive nephrolithiasis, BPH, chronic back pain. Nephrolithiasis s/p stenting in the past with recurrent UTIs.   Admit to Observation Dx; Nephrolithiasis, Right testicular pain. Urology consult. IVFs. Keflex x3 days, should end today. Continue home tadalafil and tamsulosin. Zofran prn. Pain control.         ED Triage Vitals   Temperature Pulse Respirations Blood Pressure SpO2   06/06/24 0909 06/06/24 0909 06/06/24 0909 06/06/24 0909 06/06/24 0909   97.6 °F (36.4 °C) 72 20 (!) 154/102 99 %      Temp Source Heart Rate Source Patient Position - Orthostatic VS BP Location FiO2 (%)   06/06/24 0909 -- 06/06/24 0909 06/06/24 0909 --   Temporal  Sitting Left arm       Pain Score       06/06/24 0942       8          Wt Readings from Last 1 Encounters:   06/02/24 88.5 kg (195 lb)     Additional Vital Signs:   06/07/24 07:28:30 98.4 °F (36.9 °C) 59 17 113/67 82 95 % --   06/06/24 23:11:48 -- 56 -- 106/58 74 92 % --   06/06/24 15:55:42 98.1 °F (36.7 °C) 60 -- 114/72 86 97 %      Pertinent Labs/Diagnostic Test Results:   CT abdomen pelvis w contrast   Final Result by Loraine Schwartz MD (06/06 1343)         1. Persistent right hydronephrosis and hydroureter with delayed nephrogram. There is a punctate calcification at the right UVJ as well as high density material in the urinary bladder most likely blood products and a punctate calculus in the distal right    ureter contributing to a moderate degree of ureteral obstruction.   This finding can be followed sonographically to ensure resolution.   2. Moderate fat stranding in the right retroperitoneum in association with the right ureter and ureteral enhancement which could be related to recent procedure and/or developing infection/inflammation.   The study was marked in EPIC for immediate notification.         Workstation performed: [id number]         US scrotum and testicles  "  Final Result by Loraine Schwartz MD (06/06 1202)         1. No testicular torsion.   2. Small bilateral hydroceles, complex on the right and epididymal cysts.      Workstation performed: [id number]               Results from last 7 days   Lab Units 06/07/24 0448 06/06/24 0942 06/03/24  0537 06/02/24  0428 06/01/24  2307   WBC Thousand/uL 6.06 7.84 8.27 8.53 12.27*   HEMOGLOBIN g/dL 15.5 16.9 14.8 15.7 16.8   HEMATOCRIT % 44.4 48.3 43.3 45.3 47.5   PLATELETS Thousands/uL 220 238 170 178 197   TOTAL NEUT ABS Thousands/µL  --  5.86 6.17 6.70 8.71*         Results from last 7 days   Lab Units 06/07/24 0448 06/06/24 0942 06/03/24  0537 06/02/24 0428 06/01/24  2307   SODIUM mmol/L 138 137 139 136 140   POTASSIUM mmol/L 4.1 3.6 3.7 4.1 3.8   CHLORIDE mmol/L 101 103 108 104 104   CO2 mmol/L 27 26 23 26 25   ANION GAP mmol/L 10 8 8 6 11   BUN mg/dL 28* 32* 22 26* 25   CREATININE mg/dL 1.20 1.27 1.12 1.72* 1.53*   EGFR ml/min/1.73sq m 68 63 74 44 50   CALCIUM mg/dL 8.6 9.5 8.0* 8.3* 9.1     Results from last 7 days   Lab Units 06/06/24  0942 06/01/24  2307   AST U/L 25 25   ALT U/L 44 34   ALK PHOS U/L 50 53   TOTAL PROTEIN g/dL 6.9 6.8   ALBUMIN g/dL 4.3 4.5   TOTAL BILIRUBIN mg/dL 0.93 0.77   BILIRUBIN DIRECT mg/dL 0.15  --          Results from last 7 days   Lab Units 06/07/24 0448 06/06/24 0942 06/03/24  0537 06/02/24 0428 06/01/24  2307   GLUCOSE RANDOM mg/dL 90 104 111 123 157*             No results found for: \"BETA-HYDROXYBUTYRATE\"                                                                       Results from last 7 days   Lab Units 06/06/24  0942   LIPASE u/L 32                 Results from last 7 days   Lab Units 06/06/24  1037 06/01/24  2312 06/01/24  2310   CLARITY UA  Clear  --  Clear   COLOR UA  Light Yellow Yellow Yellow   SPEC GRAV UA  1.015  --  1.025   PH UA  5.5  --  6.5   GLUCOSE UA mg/dl Negative  --  Negative   KETONES UA mg/dl Negative  --  Negative   BLOOD UA  Large*  --  Moderate* "   PROTEIN UA mg/dl 50 (1+)*  --  Negative   NITRITE UA  Negative  --  Negative   BILIRUBIN UA  Negative  --  Negative   UROBILINOGEN UA E.U./dl  --   --  0.2   UROBILINOGEN UA (BE) mg/dl <2.0  --   --    LEUKOCYTES UA  Trace*  --  Negative   WBC UA /hpf 10-20*  --  None Seen   RBC UA /hpf Innumerable*  --  10-20*   BACTERIA UA /hpf None Seen  --  None Seen   EPITHELIAL CELLS WET PREP /hpf None Seen  --  None Seen                                 Results from last 7 days   Lab Units 06/06/24  1037 06/02/24  1133   URINE CULTURE  No Growth <1000 cfu/mL No Growth <1000 cfu/mL                   ED Treatment:   Medication Administration from 06/06/2024 0907 to 06/06/2024 1526         Date/Time Order Dose Route Action Action by Comments     06/06/2024 0942 EDT HYDROmorphone (DILAUDID) injection 0.5 mg 0.5 mg Intravenous Given Irma Eden RN --     06/06/2024 1419 EDT HYDROmorphone (DILAUDID) injection 0.5 mg 0.5 mg Intravenous Given Irma Eden RN --     06/06/2024 1316 EDT HYDROmorphone (DILAUDID) injection 0.5 mg 0.5 mg Intravenous Given Venu Petersen RN --     06/06/2024 1249 EDT iohexol (OMNIPAQUE) 350 MG/ML injection (MULTI-DOSE) 100 mL 100 mL Intravenous Given Carmen Harding --          No past medical history on file.  Present on Admission:   Nephrolithiasis   Polycythemia      Admitting Diagnosis: Abdominal pain [R10.9]  Hydronephrosis of right kidney [N13.30]  Hydroureter on right [N13.4]  Acute right flank pain [R10.9]  Age/Sex: 53 y.o. male  Admission Orders:  Scheduled Medications:  cephalexin, 500 mg, Oral, Q6H CHINO  oxybutynin, 5 mg, Oral, TID  phenazopyridine, 100 mg, Oral, TID With Meals  tadalafil, 5 mg, Oral, Daily  tamsulosin, 0.4 mg, Oral, Daily With Dinner      Continuous IV Infusions:  multi-electrolyte, 125 mL/hr, Intravenous, Continuous      PRN Meds:  acetaminophen, 650 mg, Oral, Q6H PRN  HYDROmorphone, 0.5 mg, Intravenous, Q4H PRN  naloxone, 0.04 mg, Intravenous, Q1MIN PRN  ondansetron,  4 mg, Oral, Q6H PRN  oxyCODONE, 2.5 mg, Oral, Q4H PRN   Or  oxyCODONE, 5 mg, Oral, Q4H PRN  polyethylene glycol, 17 g, Oral, Daily PRN        IP CONSULT TO UROLOGY    Network Utilization Review Department  ATTENTION: Please call with any questions or concerns to [phone] and carefully listen to the prompts so that you are directed to the right person. All voicemails are confidential.   For Discharge needs, contact Care Management DC Support Team at [phone] opt. 2  Send all requests for admission clinical reviews, approved or denied determinations and any other requests to dedicated fax number below belonging to the campus where the patient is receiving treatment. List of dedicated fax numbers for the Facilities:  FACILITY NAME UR FAX NUMBER   ADMISSION DENIALS (Administrative/Medical Necessity) [phone]   DISCHARGE SUPPORT TEAM (NETWORK) [phone]   PARENT CHILD HEALTH (Maternity/NICU/Pediatrics) [phone]   [facility] [phone]   [facility] [phone]   [facility] [phone]   [facility] [phone]   [facility] [phone]   [facility] [phone]   [facility] [phone]   [facility] [phone]   [facility] [phone]   [facility] [phone]   [facility] [phone]   [facility] [phone]

## 2024-06-07 NOTE — DISCHARGE INSTR - AVS FIRST PAGE
Discharge Instructions    Kellie un seguimiento con valdez médico de atención primaria dentro de 1-2 semana (s) después del ann, llame a la oficina si tiene alguna pregunta o problema para programar yusuf geovanny.  Kellie un seguimiento con el urólogo, la geovanny está programada, isabela llame si tiene alguna pregunta o problema para confirmar la geovanny.  No hemos realizado cambios en kaylee medicamentos, por favor tome estos medicamentos lauren se describe en valdez documentación.  Si continúa teniendo más síntomas de dolor urinario, dolor testicular, dificultad para orinar o cualquier otro síntoma que empeore, acuda al departamento de emergencias de inmediato.

## 2024-06-10 ENCOUNTER — TRANSITIONAL CARE MANAGEMENT (OUTPATIENT)
Dept: INTERNAL MEDICINE CLINIC | Facility: CLINIC | Age: 54
End: 2024-06-10

## 2024-06-13 NOTE — TELEPHONE ENCOUNTER
Spoke to patient via  in the office and advised of AP's note. Central scheduling number was provided. Advised to have ultrasound performed prior to appointment on July 11. Patient is understanding.

## 2024-06-20 ENCOUNTER — TELEPHONE (OUTPATIENT)
Dept: INTERNAL MEDICINE CLINIC | Facility: CLINIC | Age: 54
End: 2024-06-20

## 2024-06-20 NOTE — TELEPHONE ENCOUNTER
Folder Color: Purple     Name of Form: Path      Form to be filled out by: Dr Shell     Form to be Faxed: [phone]     Patient made aware of 10 day business policy.

## (undated) DEVICE — CATH URETERAL 5FR X 70 CM FLEX TIP POLYUR BARD

## (undated) DEVICE — 3M™ TEGADERM™ CHG DRESSING 25/CARTON 4 CARTONS/CASE 1659: Brand: TEGADERM™

## (undated) DEVICE — GUIDEWIRE STRGHT TIP 0.035 IN  SOLO PLUS

## (undated) DEVICE — SPECIMEN CONTAINER STERILE PEEL PACK

## (undated) DEVICE — SHEATH URETERAL ACCESS 12/14FR 35CM PROXIS

## (undated) DEVICE — SYRINGE 10ML LL

## (undated) DEVICE — PACK TUR

## (undated) DEVICE — GLOVE SRG BIOGEL ECLIPSE 7.5

## (undated) DEVICE — FIBER BALL TIP QUANTA 272 MICRON